# Patient Record
Sex: FEMALE | Race: WHITE
[De-identification: names, ages, dates, MRNs, and addresses within clinical notes are randomized per-mention and may not be internally consistent; named-entity substitution may affect disease eponyms.]

---

## 2018-07-27 LAB
ALBUMIN SERPL-MCNC: 4.3 GM/DL (ref 3.2–4.5)
ALP SERPL-CCNC: 156 U/L (ref 40–136)
ALT SERPL-CCNC: 37 U/L (ref 0–55)
BASOPHILS # BLD AUTO: 0 10^3/UL (ref 0–0.1)
BASOPHILS NFR BLD AUTO: 0 % (ref 0–10)
BILIRUB SERPL-MCNC: 0.3 MG/DL (ref 0.1–1)
BUN/CREAT SERPL: 59
CALCIUM SERPL-MCNC: 10.4 MG/DL (ref 8.5–10.1)
CHLORIDE SERPL-SCNC: 102 MMOL/L (ref 98–107)
CO2 SERPL-SCNC: 26 MMOL/L (ref 21–32)
CREAT SERPL-MCNC: 0.7 MG/DL (ref 0.6–1.3)
EOSINOPHIL # BLD AUTO: 0.7 10^3/UL (ref 0–0.3)
EOSINOPHIL NFR BLD AUTO: 7 % (ref 0–10)
ERYTHROCYTE [DISTWIDTH] IN BLOOD BY AUTOMATED COUNT: 18.8 % (ref 10–14.5)
GFR SERPLBLD BASED ON 1.73 SQ M-ARVRAT: > 60 ML/MIN
GLUCOSE SERPL-MCNC: 107 MG/DL (ref 70–105)
HCT VFR BLD CALC: 38 % (ref 35–52)
HGB BLD-MCNC: 12.3 G/DL (ref 11.5–16)
LYMPHOCYTES # BLD AUTO: 1.6 X 10^3 (ref 1–4)
LYMPHOCYTES NFR BLD AUTO: 18 % (ref 12–44)
MANUAL DIFFERENTIAL PERFORMED BLD QL: NO
MCH RBC QN AUTO: 29 PG (ref 25–34)
MCHC RBC AUTO-ENTMCNC: 32 G/DL (ref 32–36)
MCV RBC AUTO: 91 FL (ref 80–99)
MONOCYTES # BLD AUTO: 0.9 X 10^3 (ref 0–1)
MONOCYTES NFR BLD AUTO: 10 % (ref 0–12)
NEUTROPHILS # BLD AUTO: 6 X 10^3 (ref 1.8–7.8)
NEUTROPHILS NFR BLD AUTO: 65 % (ref 42–75)
PLATELET # BLD: 206 10^3/UL (ref 130–400)
PMV BLD AUTO: 11.4 FL (ref 7.4–10.4)
POTASSIUM SERPL-SCNC: 4.5 MMOL/L (ref 3.6–5)
PROT SERPL-MCNC: 7.9 GM/DL (ref 6.4–8.2)
RBC # BLD AUTO: 4.22 10^6/UL (ref 4.35–5.85)
SODIUM SERPL-SCNC: 138 MMOL/L (ref 135–145)
WBC # BLD AUTO: 9.3 10^3/UL (ref 4.3–11)

## 2018-07-31 ENCOUNTER — HOSPITAL ENCOUNTER (OUTPATIENT)
Dept: HOSPITAL 75 - ONC | Age: 78
LOS: 7 days | Discharge: HOME | End: 2018-08-07
Attending: INTERNAL MEDICINE
Payer: MEDICARE

## 2018-07-31 VITALS — BODY MASS INDEX: 19.63 KG/M2 | WEIGHT: 104 LBS | HEIGHT: 61 IN

## 2018-07-31 DIAGNOSIS — C78.7: ICD-10-CM

## 2018-07-31 DIAGNOSIS — C25.0: ICD-10-CM

## 2018-07-31 DIAGNOSIS — Z93.1: ICD-10-CM

## 2018-07-31 DIAGNOSIS — C79.51: ICD-10-CM

## 2018-07-31 DIAGNOSIS — C78.01: ICD-10-CM

## 2018-07-31 DIAGNOSIS — Z79.899: ICD-10-CM

## 2018-07-31 DIAGNOSIS — C78.02: ICD-10-CM

## 2018-07-31 DIAGNOSIS — Z51.11: Primary | ICD-10-CM

## 2018-07-31 PROCEDURE — 96413 CHEMO IV INFUSION 1 HR: CPT

## 2018-07-31 PROCEDURE — 96375 TX/PRO/DX INJ NEW DRUG ADDON: CPT

## 2018-07-31 PROCEDURE — 80048 BASIC METABOLIC PNL TOTAL CA: CPT

## 2018-07-31 PROCEDURE — 36415 COLL VENOUS BLD VENIPUNCTURE: CPT

## 2018-07-31 PROCEDURE — 86301 IMMUNOASSAY TUMOR CA 19-9: CPT

## 2018-07-31 PROCEDURE — 96417 CHEMO IV INFUS EACH ADDL SEQ: CPT

## 2018-07-31 PROCEDURE — 99214 OFFICE O/P EST MOD 30 MIN: CPT

## 2018-07-31 PROCEDURE — 80053 COMPREHEN METABOLIC PANEL: CPT

## 2018-07-31 PROCEDURE — 85025 COMPLETE CBC W/AUTO DIFF WBC: CPT

## 2018-08-07 LAB
BASOPHILS # BLD AUTO: 0 10^3/UL (ref 0–0.1)
BASOPHILS NFR BLD AUTO: 0 % (ref 0–10)
BUN/CREAT SERPL: 49
CALCIUM SERPL-MCNC: 9.7 MG/DL (ref 8.5–10.1)
CHLORIDE SERPL-SCNC: 103 MMOL/L (ref 98–107)
CO2 SERPL-SCNC: 27 MMOL/L (ref 21–32)
CREAT SERPL-MCNC: 0.65 MG/DL (ref 0.6–1.3)
EOSINOPHIL # BLD AUTO: 0.3 10^3/UL (ref 0–0.3)
EOSINOPHIL NFR BLD AUTO: 8 % (ref 0–10)
ERYTHROCYTE [DISTWIDTH] IN BLOOD BY AUTOMATED COUNT: 17.3 % (ref 10–14.5)
GFR SERPLBLD BASED ON 1.73 SQ M-ARVRAT: > 60 ML/MIN
GLUCOSE SERPL-MCNC: 123 MG/DL (ref 70–105)
HCT VFR BLD CALC: 34 % (ref 35–52)
HGB BLD-MCNC: 11 G/DL (ref 11.5–16)
LYMPHOCYTES # BLD AUTO: 1.2 X 10^3 (ref 1–4)
LYMPHOCYTES NFR BLD AUTO: 37 % (ref 12–44)
MANUAL DIFFERENTIAL PERFORMED BLD QL: NO
MCH RBC QN AUTO: 29 PG (ref 25–34)
MCHC RBC AUTO-ENTMCNC: 32 G/DL (ref 32–36)
MCV RBC AUTO: 90 FL (ref 80–99)
MONOCYTES # BLD AUTO: 0.2 X 10^3 (ref 0–1)
MONOCYTES NFR BLD AUTO: 7 % (ref 0–12)
NEUTROPHILS # BLD AUTO: 1.5 X 10^3 (ref 1.8–7.8)
NEUTROPHILS NFR BLD AUTO: 47 % (ref 42–75)
PLATELET # BLD: 150 10^3/UL (ref 130–400)
PMV BLD AUTO: 10.6 FL (ref 7.4–10.4)
POTASSIUM SERPL-SCNC: 4.5 MMOL/L (ref 3.6–5)
RBC # BLD AUTO: 3.77 10^6/UL (ref 4.35–5.85)
SODIUM SERPL-SCNC: 138 MMOL/L (ref 135–145)
WBC # BLD AUTO: 3.1 10^3/UL (ref 4.3–11)

## 2018-08-14 LAB
BASOPHILS # BLD AUTO: 0 10^3/UL (ref 0–0.1)
BASOPHILS NFR BLD AUTO: 0 % (ref 0–10)
BUN/CREAT SERPL: 40
CALCIUM SERPL-MCNC: 9.5 MG/DL (ref 8.5–10.1)
CHLORIDE SERPL-SCNC: 104 MMOL/L (ref 98–107)
CO2 SERPL-SCNC: 26 MMOL/L (ref 21–32)
CREAT SERPL-MCNC: 0.57 MG/DL (ref 0.6–1.3)
EOSINOPHIL # BLD AUTO: 0.1 10^3/UL (ref 0–0.3)
EOSINOPHIL NFR BLD AUTO: 4 % (ref 0–10)
ERYTHROCYTE [DISTWIDTH] IN BLOOD BY AUTOMATED COUNT: 16.2 % (ref 10–14.5)
GFR SERPLBLD BASED ON 1.73 SQ M-ARVRAT: > 60 ML/MIN
GLUCOSE SERPL-MCNC: 113 MG/DL (ref 70–105)
HCT VFR BLD CALC: 32 % (ref 35–52)
HGB BLD-MCNC: 10.5 G/DL (ref 11.5–16)
LYMPHOCYTES # BLD AUTO: 1 X 10^3 (ref 1–4)
LYMPHOCYTES NFR BLD AUTO: 46 % (ref 12–44)
MANUAL DIFFERENTIAL PERFORMED BLD QL: NO
MCH RBC QN AUTO: 29 PG (ref 25–34)
MCHC RBC AUTO-ENTMCNC: 33 G/DL (ref 32–36)
MCV RBC AUTO: 90 FL (ref 80–99)
MONOCYTES # BLD AUTO: 0.1 X 10^3 (ref 0–1)
MONOCYTES NFR BLD AUTO: 7 % (ref 0–12)
NEUTROPHILS # BLD AUTO: 0.9 X 10^3 (ref 1.8–7.8)
NEUTROPHILS NFR BLD AUTO: 44 % (ref 42–75)
PLATELET # BLD: 81 10^3/UL (ref 130–400)
PMV BLD AUTO: 9.6 FL (ref 7.4–10.4)
POTASSIUM SERPL-SCNC: 4.3 MMOL/L (ref 3.6–5)
RBC # BLD AUTO: 3.57 10^6/UL (ref 4.35–5.85)
SODIUM SERPL-SCNC: 138 MMOL/L (ref 135–145)
WBC # BLD AUTO: 2.1 10^3/UL (ref 4.3–11)

## 2018-08-21 LAB
ALBUMIN SERPL-MCNC: 4.1 GM/DL (ref 3.2–4.5)
ALP SERPL-CCNC: 148 U/L (ref 40–136)
ALT SERPL-CCNC: 34 U/L (ref 0–55)
BASOPHILS # BLD AUTO: 0 10^3/UL (ref 0–0.1)
BASOPHILS NFR BLD AUTO: 0 % (ref 0–10)
BILIRUB SERPL-MCNC: 0.2 MG/DL (ref 0.1–1)
BUN/CREAT SERPL: 39
CALCIUM SERPL-MCNC: 9.9 MG/DL (ref 8.5–10.1)
CHLORIDE SERPL-SCNC: 102 MMOL/L (ref 98–107)
CO2 SERPL-SCNC: 28 MMOL/L (ref 21–32)
CREAT SERPL-MCNC: 0.62 MG/DL (ref 0.6–1.3)
EOSINOPHIL # BLD AUTO: 0.1 10^3/UL (ref 0–0.3)
EOSINOPHIL NFR BLD AUTO: 2 % (ref 0–10)
ERYTHROCYTE [DISTWIDTH] IN BLOOD BY AUTOMATED COUNT: 17.7 % (ref 10–14.5)
GFR SERPLBLD BASED ON 1.73 SQ M-ARVRAT: > 60 ML/MIN
GLUCOSE SERPL-MCNC: 124 MG/DL (ref 70–105)
HCT VFR BLD CALC: 37 % (ref 35–52)
HGB BLD-MCNC: 12.3 G/DL (ref 11.5–16)
LYMPHOCYTES # BLD AUTO: 1.3 X 10^3 (ref 1–4)
LYMPHOCYTES NFR BLD AUTO: 26 % (ref 12–44)
MANUAL DIFFERENTIAL PERFORMED BLD QL: NO
MCH RBC QN AUTO: 30 PG (ref 25–34)
MCHC RBC AUTO-ENTMCNC: 33 G/DL (ref 32–36)
MCV RBC AUTO: 92 FL (ref 80–99)
MONOCYTES # BLD AUTO: 0.5 X 10^3 (ref 0–1)
MONOCYTES NFR BLD AUTO: 11 % (ref 0–12)
NEUTROPHILS # BLD AUTO: 3 X 10^3 (ref 1.8–7.8)
NEUTROPHILS NFR BLD AUTO: 60 % (ref 42–75)
PLATELET # BLD: 353 10^3/UL (ref 130–400)
PMV BLD AUTO: 9.8 FL (ref 7.4–10.4)
POTASSIUM SERPL-SCNC: 4.5 MMOL/L (ref 3.6–5)
PROT SERPL-MCNC: 7.2 GM/DL (ref 6.4–8.2)
RBC # BLD AUTO: 4.05 10^6/UL (ref 4.35–5.85)
SODIUM SERPL-SCNC: 137 MMOL/L (ref 135–145)
WBC # BLD AUTO: 4.9 10^3/UL (ref 4.3–11)

## 2018-08-28 LAB
ALBUMIN SERPL-MCNC: 4 GM/DL (ref 3.2–4.5)
ALP SERPL-CCNC: 157 U/L (ref 40–136)
ALT SERPL-CCNC: 35 U/L (ref 0–55)
BASOPHILS # BLD AUTO: 0 10^3/UL (ref 0–0.1)
BASOPHILS NFR BLD AUTO: 0 % (ref 0–10)
BILIRUB SERPL-MCNC: 0.3 MG/DL (ref 0.1–1)
BUN/CREAT SERPL: 41
CALCIUM SERPL-MCNC: 9.8 MG/DL (ref 8.5–10.1)
CHLORIDE SERPL-SCNC: 104 MMOL/L (ref 98–107)
CO2 SERPL-SCNC: 22 MMOL/L (ref 21–32)
CREAT SERPL-MCNC: 0.63 MG/DL (ref 0.6–1.3)
EOSINOPHIL # BLD AUTO: 0.2 10^3/UL (ref 0–0.3)
EOSINOPHIL NFR BLD AUTO: 3 % (ref 0–10)
ERYTHROCYTE [DISTWIDTH] IN BLOOD BY AUTOMATED COUNT: 17.1 % (ref 10–14.5)
GFR SERPLBLD BASED ON 1.73 SQ M-ARVRAT: > 60 ML/MIN
GLUCOSE SERPL-MCNC: 128 MG/DL (ref 70–105)
HCT VFR BLD CALC: 37 % (ref 35–52)
HGB BLD-MCNC: 12.4 G/DL (ref 11.5–16)
LYMPHOCYTES # BLD AUTO: 1.5 X 10^3 (ref 1–4)
LYMPHOCYTES NFR BLD AUTO: 21 % (ref 12–44)
MANUAL DIFFERENTIAL PERFORMED BLD QL: NO
MCH RBC QN AUTO: 31 PG (ref 25–34)
MCHC RBC AUTO-ENTMCNC: 34 G/DL (ref 32–36)
MCV RBC AUTO: 91 FL (ref 80–99)
MONOCYTES # BLD AUTO: 1 X 10^3 (ref 0–1)
MONOCYTES NFR BLD AUTO: 14 % (ref 0–12)
NEUTROPHILS # BLD AUTO: 4.3 X 10^3 (ref 1.8–7.8)
NEUTROPHILS NFR BLD AUTO: 62 % (ref 42–75)
PLATELET # BLD: 512 10^3/UL (ref 130–400)
PMV BLD AUTO: 9.7 FL (ref 7.4–10.4)
POTASSIUM SERPL-SCNC: 4.6 MMOL/L (ref 3.6–5)
PROT SERPL-MCNC: 7.3 GM/DL (ref 6.4–8.2)
RBC # BLD AUTO: 4.05 10^6/UL (ref 4.35–5.85)
SODIUM SERPL-SCNC: 137 MMOL/L (ref 135–145)
WBC # BLD AUTO: 6.9 10^3/UL (ref 4.3–11)

## 2018-09-11 LAB
ALBUMIN SERPL-MCNC: 3.9 GM/DL (ref 3.2–4.5)
ALP SERPL-CCNC: 143 U/L (ref 40–136)
ALT SERPL-CCNC: 41 U/L (ref 0–55)
BASOPHILS # BLD AUTO: 0 10^3/UL (ref 0–0.1)
BASOPHILS NFR BLD AUTO: 0 % (ref 0–10)
BILIRUB SERPL-MCNC: 0.3 MG/DL (ref 0.1–1)
BUN/CREAT SERPL: 46
CALCIUM SERPL-MCNC: 9.8 MG/DL (ref 8.5–10.1)
CHLORIDE SERPL-SCNC: 105 MMOL/L (ref 98–107)
CO2 SERPL-SCNC: 25 MMOL/L (ref 21–32)
CREAT SERPL-MCNC: 0.61 MG/DL (ref 0.6–1.3)
EOSINOPHIL # BLD AUTO: 0.5 10^3/UL (ref 0–0.3)
EOSINOPHIL NFR BLD AUTO: 6 % (ref 0–10)
ERYTHROCYTE [DISTWIDTH] IN BLOOD BY AUTOMATED COUNT: 16.9 % (ref 10–14.5)
GFR SERPLBLD BASED ON 1.73 SQ M-ARVRAT: > 60 ML/MIN
GLUCOSE SERPL-MCNC: 120 MG/DL (ref 70–105)
HCT VFR BLD CALC: 36 % (ref 35–52)
HGB BLD-MCNC: 12.1 G/DL (ref 11.5–16)
LYMPHOCYTES # BLD AUTO: 1.4 X 10^3 (ref 1–4)
LYMPHOCYTES NFR BLD AUTO: 17 % (ref 12–44)
MANUAL DIFFERENTIAL PERFORMED BLD QL: NO
MCH RBC QN AUTO: 31 PG (ref 25–34)
MCHC RBC AUTO-ENTMCNC: 33 G/DL (ref 32–36)
MCV RBC AUTO: 92 FL (ref 80–99)
MONOCYTES # BLD AUTO: 0.9 X 10^3 (ref 0–1)
MONOCYTES NFR BLD AUTO: 11 % (ref 0–12)
NEUTROPHILS # BLD AUTO: 5.1 X 10^3 (ref 1.8–7.8)
NEUTROPHILS NFR BLD AUTO: 65 % (ref 42–75)
PLATELET # BLD: 133 10^3/UL (ref 130–400)
PMV BLD AUTO: 10 FL (ref 7.4–10.4)
POTASSIUM SERPL-SCNC: 4.4 MMOL/L (ref 3.6–5)
PROT SERPL-MCNC: 6.9 GM/DL (ref 6.4–8.2)
RBC # BLD AUTO: 3.92 10^6/UL (ref 4.35–5.85)
SODIUM SERPL-SCNC: 138 MMOL/L (ref 135–145)
WBC # BLD AUTO: 7.9 10^3/UL (ref 4.3–11)

## 2018-09-25 ENCOUNTER — HOSPITAL ENCOUNTER (OUTPATIENT)
Dept: HOSPITAL 75 - ONC | Age: 78
LOS: 14 days | Discharge: HOME | End: 2018-10-09
Attending: INTERNAL MEDICINE
Payer: MEDICARE

## 2018-09-25 VITALS — BODY MASS INDEX: 20.01 KG/M2 | WEIGHT: 106 LBS | HEIGHT: 61 IN

## 2018-09-25 DIAGNOSIS — Z93.1: ICD-10-CM

## 2018-09-25 DIAGNOSIS — C25.0: ICD-10-CM

## 2018-09-25 DIAGNOSIS — C78.02: ICD-10-CM

## 2018-09-25 DIAGNOSIS — Z51.11: Primary | ICD-10-CM

## 2018-09-25 DIAGNOSIS — C79.51: ICD-10-CM

## 2018-09-25 DIAGNOSIS — C78.7: ICD-10-CM

## 2018-09-25 DIAGNOSIS — C78.01: ICD-10-CM

## 2018-09-25 DIAGNOSIS — Z79.899: ICD-10-CM

## 2018-09-25 LAB
ALBUMIN SERPL-MCNC: 4 GM/DL (ref 3.2–4.5)
ALP SERPL-CCNC: 142 U/L (ref 40–136)
ALT SERPL-CCNC: 44 U/L (ref 0–55)
BASOPHILS # BLD AUTO: 0 10^3/UL (ref 0–0.1)
BASOPHILS NFR BLD AUTO: 0 % (ref 0–10)
BILIRUB SERPL-MCNC: 0.3 MG/DL (ref 0.1–1)
BUN/CREAT SERPL: 47
CALCIUM SERPL-MCNC: 9.6 MG/DL (ref 8.5–10.1)
CHLORIDE SERPL-SCNC: 104 MMOL/L (ref 98–107)
CO2 SERPL-SCNC: 25 MMOL/L (ref 21–32)
CREAT SERPL-MCNC: 0.62 MG/DL (ref 0.6–1.3)
EOSINOPHIL # BLD AUTO: 0.3 10^3/UL (ref 0–0.3)
EOSINOPHIL NFR BLD AUTO: 4 % (ref 0–10)
ERYTHROCYTE [DISTWIDTH] IN BLOOD BY AUTOMATED COUNT: 16.6 % (ref 10–14.5)
GFR SERPLBLD BASED ON 1.73 SQ M-ARVRAT: > 60 ML/MIN
GLUCOSE SERPL-MCNC: 119 MG/DL (ref 70–105)
HCT VFR BLD CALC: 36 % (ref 35–52)
HGB BLD-MCNC: 12.1 G/DL (ref 11.5–16)
LYMPHOCYTES # BLD AUTO: 1.4 X 10^3 (ref 1–4)
LYMPHOCYTES NFR BLD AUTO: 19 % (ref 12–44)
MANUAL DIFFERENTIAL PERFORMED BLD QL: NO
MCH RBC QN AUTO: 31 PG (ref 25–34)
MCHC RBC AUTO-ENTMCNC: 33 G/DL (ref 32–36)
MCV RBC AUTO: 93 FL (ref 80–99)
MONOCYTES # BLD AUTO: 0.8 X 10^3 (ref 0–1)
MONOCYTES NFR BLD AUTO: 12 % (ref 0–12)
NEUTROPHILS # BLD AUTO: 4.7 X 10^3 (ref 1.8–7.8)
NEUTROPHILS NFR BLD AUTO: 66 % (ref 42–75)
PLATELET # BLD: 209 10^3/UL (ref 130–400)
PMV BLD AUTO: 9.8 FL (ref 7.4–10.4)
POTASSIUM SERPL-SCNC: 4.6 MMOL/L (ref 3.6–5)
PROT SERPL-MCNC: 7.3 GM/DL (ref 6.4–8.2)
RBC # BLD AUTO: 3.92 10^6/UL (ref 4.35–5.85)
SODIUM SERPL-SCNC: 138 MMOL/L (ref 135–145)
WBC # BLD AUTO: 7.1 10^3/UL (ref 4.3–11)

## 2018-09-25 PROCEDURE — 96375 TX/PRO/DX INJ NEW DRUG ADDON: CPT

## 2018-09-25 PROCEDURE — 96413 CHEMO IV INFUSION 1 HR: CPT

## 2018-09-25 PROCEDURE — 80048 BASIC METABOLIC PNL TOTAL CA: CPT

## 2018-09-25 PROCEDURE — 86301 IMMUNOASSAY TUMOR CA 19-9: CPT

## 2018-09-25 PROCEDURE — 36591 DRAW BLOOD OFF VENOUS DEVICE: CPT

## 2018-09-25 PROCEDURE — 80053 COMPREHEN METABOLIC PANEL: CPT

## 2018-09-25 PROCEDURE — 96417 CHEMO IV INFUS EACH ADDL SEQ: CPT

## 2018-09-25 PROCEDURE — 36415 COLL VENOUS BLD VENIPUNCTURE: CPT

## 2018-09-25 PROCEDURE — 85025 COMPLETE CBC W/AUTO DIFF WBC: CPT

## 2018-10-09 LAB
BASOPHILS # BLD AUTO: 0 10^3/UL (ref 0–0.1)
BASOPHILS NFR BLD AUTO: 0 % (ref 0–10)
BUN/CREAT SERPL: 41
CALCIUM SERPL-MCNC: 9.4 MG/DL (ref 8.5–10.1)
CHLORIDE SERPL-SCNC: 105 MMOL/L (ref 98–107)
CO2 SERPL-SCNC: 25 MMOL/L (ref 21–32)
CREAT SERPL-MCNC: 0.58 MG/DL (ref 0.6–1.3)
EOSINOPHIL # BLD AUTO: 0.2 10^3/UL (ref 0–0.3)
EOSINOPHIL NFR BLD AUTO: 3 % (ref 0–10)
ERYTHROCYTE [DISTWIDTH] IN BLOOD BY AUTOMATED COUNT: 15.9 % (ref 10–14.5)
GFR SERPLBLD BASED ON 1.73 SQ M-ARVRAT: > 60 ML/MIN
GLUCOSE SERPL-MCNC: 90 MG/DL (ref 70–105)
HCT VFR BLD CALC: 35 % (ref 35–52)
HGB BLD-MCNC: 11.2 G/DL (ref 11.5–16)
LYMPHOCYTES # BLD AUTO: 1.6 X 10^3 (ref 1–4)
LYMPHOCYTES NFR BLD AUTO: 25 % (ref 12–44)
MANUAL DIFFERENTIAL PERFORMED BLD QL: NO
MCH RBC QN AUTO: 31 PG (ref 25–34)
MCHC RBC AUTO-ENTMCNC: 33 G/DL (ref 32–36)
MCV RBC AUTO: 95 FL (ref 80–99)
MONOCYTES # BLD AUTO: 0.9 X 10^3 (ref 0–1)
MONOCYTES NFR BLD AUTO: 14 % (ref 0–12)
NEUTROPHILS # BLD AUTO: 3.7 X 10^3 (ref 1.8–7.8)
NEUTROPHILS NFR BLD AUTO: 58 % (ref 42–75)
PLATELET # BLD: 231 10^3/UL (ref 130–400)
PMV BLD AUTO: 10 FL (ref 7.4–10.4)
POTASSIUM SERPL-SCNC: 4.5 MMOL/L (ref 3.6–5)
RBC # BLD AUTO: 3.64 10^6/UL (ref 4.35–5.85)
SODIUM SERPL-SCNC: 138 MMOL/L (ref 135–145)
WBC # BLD AUTO: 6.3 10^3/UL (ref 4.3–11)

## 2018-10-23 LAB
ALBUMIN SERPL-MCNC: 4 GM/DL (ref 3.2–4.5)
ALP SERPL-CCNC: 595 U/L (ref 40–136)
ALT SERPL-CCNC: 498 U/L (ref 0–55)
BASOPHILS # BLD AUTO: 0 10^3/UL (ref 0–0.1)
BASOPHILS NFR BLD AUTO: 0 % (ref 0–10)
BILIRUB SERPL-MCNC: 0.5 MG/DL (ref 0.1–1)
BUN/CREAT SERPL: 41
CALCIUM SERPL-MCNC: 9.8 MG/DL (ref 8.5–10.1)
CHLORIDE SERPL-SCNC: 103 MMOL/L (ref 98–107)
CO2 SERPL-SCNC: 23 MMOL/L (ref 21–32)
CREAT SERPL-MCNC: 0.61 MG/DL (ref 0.6–1.3)
EOSINOPHIL # BLD AUTO: 0.2 10^3/UL (ref 0–0.3)
EOSINOPHIL NFR BLD AUTO: 3 % (ref 0–10)
ERYTHROCYTE [DISTWIDTH] IN BLOOD BY AUTOMATED COUNT: 15.9 % (ref 10–14.5)
GFR SERPLBLD BASED ON 1.73 SQ M-ARVRAT: > 60 ML/MIN
GLUCOSE SERPL-MCNC: 132 MG/DL (ref 70–105)
HCT VFR BLD CALC: 35 % (ref 35–52)
HGB BLD-MCNC: 11.3 G/DL (ref 11.5–16)
LYMPHOCYTES # BLD AUTO: 1.2 X 10^3 (ref 1–4)
LYMPHOCYTES NFR BLD AUTO: 19 % (ref 12–44)
MANUAL DIFFERENTIAL PERFORMED BLD QL: NO
MCH RBC QN AUTO: 31 PG (ref 25–34)
MCHC RBC AUTO-ENTMCNC: 32 G/DL (ref 32–36)
MCV RBC AUTO: 94 FL (ref 80–99)
MONOCYTES # BLD AUTO: 0.8 X 10^3 (ref 0–1)
MONOCYTES NFR BLD AUTO: 13 % (ref 0–12)
NEUTROPHILS # BLD AUTO: 4.1 X 10^3 (ref 1.8–7.8)
NEUTROPHILS NFR BLD AUTO: 66 % (ref 42–75)
PLATELET # BLD: 164 10^3/UL (ref 130–400)
PMV BLD AUTO: 10.1 FL (ref 7.4–10.4)
POTASSIUM SERPL-SCNC: 4.5 MMOL/L (ref 3.6–5)
PROT SERPL-MCNC: 7.5 GM/DL (ref 6.4–8.2)
RBC # BLD AUTO: 3.71 10^6/UL (ref 4.35–5.85)
SODIUM SERPL-SCNC: 138 MMOL/L (ref 135–145)
WBC # BLD AUTO: 6.2 10^3/UL (ref 4.3–11)

## 2018-10-24 ENCOUNTER — HOSPITAL ENCOUNTER (INPATIENT)
Dept: HOSPITAL 75 - 4TH | Age: 78
LOS: 5 days | Discharge: NURSING, REHAB OR LONG-TERM CARE | DRG: 481 | End: 2018-10-29
Attending: FAMILY MEDICINE | Admitting: FAMILY MEDICINE
Payer: MEDICARE

## 2018-10-24 VITALS — WEIGHT: 113.44 LBS | HEIGHT: 62 IN | BODY MASS INDEX: 20.88 KG/M2

## 2018-10-24 VITALS — SYSTOLIC BLOOD PRESSURE: 120 MMHG | DIASTOLIC BLOOD PRESSURE: 56 MMHG

## 2018-10-24 VITALS — SYSTOLIC BLOOD PRESSURE: 139 MMHG | DIASTOLIC BLOOD PRESSURE: 64 MMHG

## 2018-10-24 VITALS — DIASTOLIC BLOOD PRESSURE: 63 MMHG | SYSTOLIC BLOOD PRESSURE: 150 MMHG

## 2018-10-24 VITALS — DIASTOLIC BLOOD PRESSURE: 53 MMHG | SYSTOLIC BLOOD PRESSURE: 106 MMHG

## 2018-10-24 DIAGNOSIS — C79.51: ICD-10-CM
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
## 2018-10-24 DIAGNOSIS — R74.0: ICD-10-CM
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
## 2018-10-24 DIAGNOSIS — D62: ICD-10-CM

## 2018-10-24 DIAGNOSIS — Z92.21: ICD-10-CM

## 2018-10-24 DIAGNOSIS — C78.01: ICD-10-CM

## 2018-10-24 DIAGNOSIS — S72.142A: Primary | ICD-10-CM

## 2018-10-24 DIAGNOSIS — S72.22XA: ICD-10-CM

## 2018-10-24 DIAGNOSIS — G72.41: ICD-10-CM

## 2018-10-24 DIAGNOSIS — C25.0: ICD-10-CM

## 2018-10-24 DIAGNOSIS — C78.02: ICD-10-CM

## 2018-10-24 DIAGNOSIS — R64: ICD-10-CM

## 2018-10-24 DIAGNOSIS — Z93.1: ICD-10-CM

## 2018-10-24 DIAGNOSIS — C78.7: ICD-10-CM

## 2018-10-24 PROCEDURE — 86900 BLOOD TYPING SEROLOGIC ABO: CPT

## 2018-10-24 PROCEDURE — 87081 CULTURE SCREEN ONLY: CPT

## 2018-10-24 PROCEDURE — 82962 GLUCOSE BLOOD TEST: CPT

## 2018-10-24 PROCEDURE — 36415 COLL VENOUS BLD VENIPUNCTURE: CPT

## 2018-10-24 PROCEDURE — 86850 RBC ANTIBODY SCREEN: CPT

## 2018-10-24 PROCEDURE — 80053 COMPREHEN METABOLIC PANEL: CPT

## 2018-10-24 PROCEDURE — 0QS736Z REPOSITION LEFT UPPER FEMUR WITH INTRAMEDULLARY INTERNAL FIXATION DEVICE, PERCUTANEOUS APPROACH: ICD-10-PCS | Performed by: ORTHOPAEDIC SURGERY

## 2018-10-24 PROCEDURE — 86901 BLOOD TYPING SEROLOGIC RH(D): CPT

## 2018-10-24 PROCEDURE — 85025 COMPLETE CBC W/AUTO DIFF WBC: CPT

## 2018-10-24 PROCEDURE — 80048 BASIC METABOLIC PNL TOTAL CA: CPT

## 2018-10-24 PROCEDURE — 94760 N-INVAS EAR/PLS OXIMETRY 1: CPT

## 2018-10-24 PROCEDURE — 86920 COMPATIBILITY TEST SPIN: CPT

## 2018-10-24 RX ADMIN — FENTANYL CITRATE PRN MCG: 50 INJECTION, SOLUTION INTRAMUSCULAR; INTRAVENOUS at 11:30

## 2018-10-24 RX ADMIN — FENTANYL CITRATE PRN MCG: 50 INJECTION, SOLUTION INTRAMUSCULAR; INTRAVENOUS at 15:53

## 2018-10-24 RX ADMIN — FENTANYL CITRATE PRN MCG: 50 INJECTION, SOLUTION INTRAMUSCULAR; INTRAVENOUS at 13:53

## 2018-10-24 RX ADMIN — SODIUM CHLORIDE, SODIUM LACTATE, POTASSIUM CHLORIDE, AND CALCIUM CHLORIDE SCH MLS/HR: 600; 310; 30; 20 INJECTION, SOLUTION INTRAVENOUS at 17:44

## 2018-10-24 RX ADMIN — Medication SCH ML: at 13:53

## 2018-10-24 RX ADMIN — SODIUM CHLORIDE, SODIUM LACTATE, POTASSIUM CHLORIDE, AND CALCIUM CHLORIDE SCH MLS/HR: 600; 310; 30; 20 INJECTION, SOLUTION INTRAVENOUS at 19:30

## 2018-10-24 RX ADMIN — Medication SCH ML: at 21:49

## 2018-10-24 RX ADMIN — SODIUM CHLORIDE, SODIUM LACTATE, POTASSIUM CHLORIDE, AND CALCIUM CHLORIDE SCH MLS/HR: 600; 310; 30; 20 INJECTION, SOLUTION INTRAVENOUS at 23:30

## 2018-10-24 NOTE — PROGRESS NOTE-PRE OPERATIVE
Pre-Operative Progress Note


H&P Reviewed


The H&P was reviewed, patient examined and no changes noted.


Date Seen by Provider:  Oct 24, 2018


Time Seen by Provider:  12:00


Date H&P Reviewed:  Oct 24, 2018


Time H&P Reviewed:  12:00


Pre-Operative Diagnosis:  Displaced intertrochanteric/subtrochanteric fracture 

left hip











SUSANA POSADA DO Oct 24, 2018 8:17 pm

## 2018-10-24 NOTE — CONSULTATION
History of Present Illness


History of Present Illness


Patient Consulted On(pavan/time)


10/24/18


 11:54


Date Seen by Provider:  Oct 24, 2018


Time Seen by Provider:  11:30


Reason for Visit:  fell and dx with left hip fracture


History of Present Illness


Patient fell and was seen through [facility]. She is currently being 

treated through [facility] for pancreatic cancer. She ambulates 

independently but does have bilateral lower extremity weakness. She fell and 

was diagnosed with a hip fracture. She was transferred here for orthopedic 

services and possible surgery as well as medical management.





Allergies and Home Medications


Allergies


Coded Allergies:  


     midazolam (Verified  Allergy, Severe, 10/24/18)


 


 CODED


     Sulfa (Sulfonamide Antibiotics) (Verified  Allergy, Unknown, 10/24/18)





Home Medications


Albuterol Sulfate 18 Gm Hfa.aer.ad, 1-2 PUFF INH QID PRN for CONGESTION, (

Reported)


Diphenhydramine HCl 25 Mg Capsule, 25 MG PO HS, (Reported)


Levothyroxine Sodium 75 Mcg Tablet, 75 MCG PO DAILY, (Reported)


Lipase/Protease/Amylase 1 Each Capsule.dr, 1 CAP PO TIDAC, (Reported)


Loratadine 10 Mg Tablet, 10 MG PO DAILY, (Reported)


Lorazepam 0.5 Mg Tablet, 0.5 MG PO HS PRN for ANXIETY/SLEEP, (Reported)


Multivits W-Min/Ferrous Gluc 9 Mg/15 Ml Liquid, 15 ML GT DAILY, (Reported)


Ondansetron HCl 8 Mg Tablet, 8 MG PO BID PRN for NAUSEA/VOMITING-1ST LINE, (

Reported)


Tramadol HCl 50 Mg Tablet, 50 MG PO BID PRN for PAIN-MODERATE, (Reported)





Patient Home Medication List


Home Medication List Reviewed:  Yes





Past Medical-Social-Family Hx


Past Med/Social Hx:  Reviewed Nursing Past Med/Soc Hx


Past Medical History


Pregnant:  No





Review of Systems-General


Constitutional:  no symptoms reported


EENTM:  no symptoms reported


Respiratory:  cough, dyspnea on exertion


Cardiovascular:  no symptoms reported


Gastrointestinal:  loss of appetite


Genitourinary:  no symptoms reported


Musculoskeletal:  joint pain, joint swelling


Skin:  dryness


Psychiatric/Neurological:  No Symptoms Reported





Physical Exam-General Problems


Physical Exam


Vital Signs


Capillary Refill :


General Appearance:  no apparent distress, cachetic


HEENT:  PERRL/EOMI, normal ENT inspection


Neck:  non-tender, full range of motion


Respiratory:  chest non-tender, no accessory muscle use


Cardiovascular:  regular rate, rhythm, no JVD, no murmur


Peripheral Pulses:  2+ Left Dors-Pedis (L)


Gastrointestinal:  non tender


Back:  no CVA tenderness


Extremities:  no pedal edema, no calf tenderness, normal capillary refill, 

other (left leg shortening with external rotation, tenderness with ecchymosis 

to left hip)


Neurologic/Psychiatric:  no motor/sensory deficits, alert, normal mood/affect, 

oriented x 3


Skin:  warm/dry





Assessment/Plan


Assessment/Plan


Admission Diagnosis/Plan


A: displaced traumatic intertrochanteric/subtrochanteric fracture of left hip


hx of pancreatic cancer





P: I spoke with patient and family about risks of surgery vs no surgery. They 

want to proceed forward with surgery with the understanding that she is 

chronically ill and very high risk. Orders placed to obtain consent for 

intramedullary nailing of fracture. I discussed risks, indications, 

complications, expectations, and convalescence of surgery and informed consent 

was obtained. All their questions were answered. Dr. Landeros then spoke with 

the patient and family as well and agrees with this note.


Admission Status:  Inpatient Order (span 2 midnights)


Reason for Inpatient Admission:  


intertrochanteric fracture of left hip and needs left hip intramedullary


nailing. She will need extensive medical management after surgery due to


current cancer and comorbidities.











SUZAN BOTELLO Oct 24, 2018 11:59 am

## 2018-10-24 NOTE — DIAGNOSTIC IMAGING REPORT
INDICATION:  Hip fracture undergoing fixation.



TECHNIQUE:  

4 intraprocedural images left hip and femur



CORRELATION STUDY: 

None



FINDINGS:  

Intraoperative imaging demonstrates placement of a longstem

intramedullary aye and proximal nail transfixing the proximal

femur fracture. 

Fluoroscopy time: 2 minutes



IMPRESSION:

1. Internal fixation performed of the left femur fracture.



Dictated by: 



  Dictated on workstation # [id number]

## 2018-10-24 NOTE — OPERATIVE REPORT
DATE OF SERVICE:  10/24/2018



PREOPERATIVE DIAGNOSIS:

Displaced intertrochanteric subtrochanteric fracture, left hip.



POSTOPERATIVE DIAGNOSIS:

Displaced intertrochanteric subtrochanteric fracture, left hip.



PROCEDURE:

Intramedullary nailing intertrochanteric subtrochanteric fracture, left hip.



SURGEON:

Susana Posada DO



FIRST ASSISTANT:

CHERYL Wright



SURGICAL FIRST ASSIST DUTIES:

Hilario Blanco, surgical first assistant was utilized throughout the entire

procedure for patient positioning on the fracture table, soft tissue retraction,

placement of metallic internal fixation device (trochanteric fixation nail),

wound closure, dressing application and the patient transfer.



ANESTHESIA:

General.



ESTIMATED BLOOD LOSS:

150 mL.



INDICATIONS AND FINDINGS:

The patient is an 78-year-old female who slipped and fell in the bathroom.  She

normally walks with a walker.  She has weakness in her left lower extremity and

a foot drop on the left.  She noted immediate left hip pain.  She was evaluated

in [facility].  X-rays were obtained.  A displaced intertrochanteric

subtrochanteric fracture was identified.  The patient was transported to [facility] in [place], [place] for definitive orthopedic care.



DESCRIPTION OF PROCEDURE:

The patient was taken to surgery where an intramedullary nailing of her left hip

fracture was performed utilizing the Synthes system with an 11 mm x 130 degree

360 mm long trochanteric fixation nail.  A 95 mm TFNA fenestrated helical blade

95 mm in length was utilized along with a 44 mm x 5 mm distal locking screw.



PROCEDURE IN DETAIL:

The patient was transferred to the operating room where general inhalation

anesthetic was administered.  The patient was placed supine upon the fracture

table.  The right lower extremity was draped out of the operating field.  Left

lower extremity was then placed in traction.  The C-arm was used to verify

fracture reduction.  A ChloraPrep and sterile drape of the left hip and left

lower extremity was performed.  A longitudinal incision was made proximal to the

greater trochanter and an incision was deepened.  The  hole was then

developed with a guide pin placed through the tip of the greater trochanter with

this area overreamed.  A guide aye was placed within the femur.  The patient had

some anterior angulation of her fracture and with pressure applied to the _____

surface of the hip utilizing a small incision the _____ surface of the hip and a

hip reduction tool and a guide aye was then placed down the femoral shaft. 

Initially, the guide aye was placed to anterior and the distal cortex with the

reduction aye and this reduction tool the midline placement of the guide aye was

obtained distally.



Over the guide aye the canal was reamed to an 11.5 mm diameter.  The 11 mm nail

was then inserted, tapped into position.  Through the outrigger drill guide a

guide pin was placed through the lateral femoral cortex through the aye and into

the central aspect of the femoral head.  This was verified in the AP and lateral

plane.  Lateral cortex was overdrilled and the central core was drilled up

within a cm and a half of the articular surface.  The 95 mm helical blade was

then impacted into position.  The screw was tightened proximally through the aye

in a static fashion.



The outrigger was removed.  The leg was taken out of an adducted position and a

C-arm was used to verify the distal locking holes.  A stab incision was made on

the lateral thigh.  Incision was deepened.  A drill was used to place a drill

bit through the lateral cortex across the femoral aye and the central dynamic

screw went through the medial cortex.  A 44 mm locking screw was then placed. 

The position of the distal screw, the alignment of the aye and the proximal

aspect of the construct was verified fluoroscopically.



The wounds were irrigated extensively with normal saline solution.  The fascia

at the proximal incision was closed with a running suture of 0 Vicryl suture. 

The subcutaneous tissues were closed with 0 and 2-0 Vicryl suture.  The skin was

closed with stainless steel staples and Adaptic Neosporin bulky dressing was

placed in the left thigh and the left leg.  The patient was awakened and was

transported to postop recovery (ICU) in satisfactory condition.





Job ID: [id number]

DocumentID: [id number]

Dictated Date:  10/24/2018 20:25:43

Transcription Date: 10/24/2018 23:08:46

Dictated By: SUSANA POSADA DO

## 2018-10-24 NOTE — PROGRESS NOTE-POST OPERATIVE
Post-Operative Progess Note


Surgeon (s)/Assistant (s)


Surgeon


SUSANA POSADA DO


Assistant:  Hilario Blanco NPJEANIE





Pre-Operative Diagnosis


Displaced intertrochanteric/subtrochanteric fracture left hip





Post-Operative Diagnosis





same





Procedure & Operative Findings


Date of Procedure


10/24/18


Procedure Performed/Findings


Intramedullary nailing intertrochanteric/substrochanteric fracture left hip


Anesthesia Type


General





Estimated Blood Loss


Estimated blood loss (mL):  150 ml





Specimens/Packing


Specimens Removed


none











SUSANA POSADA DO Oct 24, 2018 8:19 pm

## 2018-10-24 NOTE — HISTORY & PHYSICAL-HOSPITALIST
History of Present Illness


HPI/Chief Complaint


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 

presented to outside ER for evaluation after a fall. She states she attempted 

to walk without her walker this morning and stumbled over her feet and fell. 

She is currently living with her brother and sister in law and her sister in 

law found her shortly after the fall and called 911. She was brought to the ER 

at [facility] and was found to have a left intertrochanteric hip fracture. She was 

transferred here for surgical evaluation. She denies any complaints at this 

time but thinks he pain is increasing as the medicine wears off. She notes a 

history of complication follow port placement when she received Versed at . 

She states she "crashed" and believes she was coded but does not know all of 

the details.


Source:  patient


Date Seen


10/24/18


Time Seen by a Provider:  11:51


Attending Physician


Rickey Solano MD


PCP


Miguelina Daley MD


Referring Physician





Date of Admission


Oct 24, 2018 at 10:44 am





Home Medications & Allergies


Home Medications


Reviewed patient Home Medication Reconciliation performed by pharmacy 

medication reconciliations technician and/or nursing.


Patients Allergies have been reviewed.





Allergies





Allergies


Coded Allergies


  midazolam (Verified Allergy, Severe, 10/24/18)


    CODED


  Sulfa (Sulfonamide Antibiotics) (Verified Allergy, Unknown, 10/24/18)








Past Medical-Social-Family Hx


Past Med/Social Hx:  Reviewed Nursing Past Med/Soc Hx


Patient Social History


Employed/Student:  retired


Alcohol Use:  Denies Use


Smoking Status:  Never a Smoker





Past Medical History


Surgeries:  Oophorectomy


port placement


inclusion body myositis


Cancer:  Pancreatic


Did You Recieve Any Treatments:  Yes


What Type of Treatment Did You:  Chemotherapy





Family History


Reviewed Nursing Family Hx


Heart Disease, Cancer





Review of Systems


Constitutional:  No chills, No fever


EENTM:  No blurred vision, No double vision, No nose congestion, No throat pain


Respiratory:  No cough, No dyspnea on exertion, No short of breath


Cardiovascular:  No chest pain, No edema, No palpitations


Gastrointestinal:  No abdominal pain, No constipation, No diarrhea, No nausea, 

No vomiting


Genitourinary:  No dysuria, No frequency


Musculoskeletal:  see HPI, joint pain


Skin:  No lesions, No rash


Psychiatric/Neurological:  Denies Headache, Denies Numbness, Denies Tingling





Physical Exam


Physical Exam


Vital Signs





Vital Signs - First Documented








 10/24/18





 10:45


 


Temp 98.9


 


Pulse 82


 


Resp 14


 


B/P (MAP) 150/63 (92)


 


Pulse Ox 98


 


O2 Delivery Room Air





Capillary Refill :


Height, Weight, BMI


Height: 5'2.00"


Weight: 112lbs. 8.0oz. 51.883880bf; 20.6 BMI


Method:


General Appearance:  No Apparent Distress, Chronically ill, Cachetic, Thin


Neck:  Normal Inspection, Supple


Respiratory:  Lungs Clear, No Respiratory Distress


Cardiovascular:  Regular Rate, Rhythm, No Murmur


Gastrointestinal:  Normal Bowel Sounds, Non Tender, Soft


Extremity:  Normal Capillary Refill, No Calf Tenderness, No Pedal Edema


Neurologic/Psychiatric:  Alert, Oriented x3





Results


Results/Procedures


Labs


Patient resulted labs reviewed.


Imaging:  Reviewed Imaging Report





Assessment/Plan


Admission Diagnosis


left hip fracture


Admission Status:  Inpatient Order (span 2 midnights)


Reason for Inpatient Admission:  


Needs more than two midnights to stabilize for DC





Diagnosis/Problems


Diagnosis/Problems





(1) Intertrochanteric fracture of left hip


Status:  Acute


Assessment & Plan:  Ortho consulted, appreciate recs


Plan for OR today


Moderate risk for surgery


Discussed complication from previous anesthesia with CRNA 


   Attempt to find records from incident


Qualifiers:  


   Encounter type:  initial encounter  Fracture type:  closed  Fracture 

alignment:  displaced  Qualified Codes:  S72.142A - Displaced intertrochanteric 

fracture of left femur, initial encounter for closed fracture


(2) Pancreatic cancer


Status:  Chronic


Assessment & Plan:  Follows with Dr Edwards


Received chemo yesterday


Will consult Dr Edwards


Qualifiers:  


   Pancreatic malignancy location:  head of pancreas  Qualified Codes:  C25.0 - 

Malignant neoplasm of head of pancreas


(3) Inclusion body myositis


Assessment & Plan:  Will consult PT/OT 





(4) Transaminitis


Assessment & Plan:  New per yesterday's labs


Will repeat in AM














RICKEY SOLANO MD Oct 24, 2018 11:57 am

## 2018-10-25 VITALS — DIASTOLIC BLOOD PRESSURE: 55 MMHG | SYSTOLIC BLOOD PRESSURE: 109 MMHG

## 2018-10-25 VITALS — DIASTOLIC BLOOD PRESSURE: 58 MMHG | SYSTOLIC BLOOD PRESSURE: 125 MMHG

## 2018-10-25 VITALS — DIASTOLIC BLOOD PRESSURE: 46 MMHG | SYSTOLIC BLOOD PRESSURE: 97 MMHG

## 2018-10-25 VITALS — SYSTOLIC BLOOD PRESSURE: 100 MMHG | DIASTOLIC BLOOD PRESSURE: 51 MMHG

## 2018-10-25 VITALS — DIASTOLIC BLOOD PRESSURE: 55 MMHG | SYSTOLIC BLOOD PRESSURE: 105 MMHG

## 2018-10-25 VITALS — SYSTOLIC BLOOD PRESSURE: 115 MMHG | DIASTOLIC BLOOD PRESSURE: 55 MMHG

## 2018-10-25 VITALS — SYSTOLIC BLOOD PRESSURE: 103 MMHG | DIASTOLIC BLOOD PRESSURE: 49 MMHG

## 2018-10-25 VITALS — DIASTOLIC BLOOD PRESSURE: 58 MMHG | SYSTOLIC BLOOD PRESSURE: 118 MMHG

## 2018-10-25 VITALS — SYSTOLIC BLOOD PRESSURE: 137 MMHG | DIASTOLIC BLOOD PRESSURE: 59 MMHG

## 2018-10-25 LAB
ALBUMIN SERPL-MCNC: 3.2 GM/DL (ref 3.2–4.5)
ALP SERPL-CCNC: 286 U/L (ref 40–136)
ALT SERPL-CCNC: 151 U/L (ref 0–55)
BASOPHILS # BLD AUTO: 0 10^3/UL (ref 0–0.1)
BASOPHILS NFR BLD AUTO: 0 % (ref 0–10)
BILIRUB SERPL-MCNC: 0.5 MG/DL (ref 0.1–1)
BUN/CREAT SERPL: 32
CALCIUM SERPL-MCNC: 8.6 MG/DL (ref 8.5–10.1)
CHLORIDE SERPL-SCNC: 104 MMOL/L (ref 98–107)
CO2 SERPL-SCNC: 24 MMOL/L (ref 21–32)
CREAT SERPL-MCNC: 0.59 MG/DL (ref 0.6–1.3)
EOSINOPHIL # BLD AUTO: 0 10^3/UL (ref 0–0.3)
EOSINOPHIL NFR BLD AUTO: 0 % (ref 0–10)
ERYTHROCYTE [DISTWIDTH] IN BLOOD BY AUTOMATED COUNT: 15.6 % (ref 10–14.5)
GFR SERPLBLD BASED ON 1.73 SQ M-ARVRAT: > 60 ML/MIN
GLUCOSE SERPL-MCNC: 129 MG/DL (ref 70–105)
HCT VFR BLD CALC: 21 % (ref 35–52)
HGB BLD-MCNC: 6.9 G/DL (ref 11.5–16)
LYMPHOCYTES # BLD AUTO: 0.8 X 10^3 (ref 1–4)
LYMPHOCYTES NFR BLD AUTO: 11 % (ref 12–44)
MANUAL DIFFERENTIAL PERFORMED BLD QL: NO
MCH RBC QN AUTO: 31 PG (ref 25–34)
MCHC RBC AUTO-ENTMCNC: 32 G/DL (ref 32–36)
MCV RBC AUTO: 96 FL (ref 80–99)
MONOCYTES # BLD AUTO: 0.2 X 10^3 (ref 0–1)
MONOCYTES NFR BLD AUTO: 3 % (ref 0–12)
NEUTROPHILS # BLD AUTO: 6.4 X 10^3 (ref 1.8–7.8)
NEUTROPHILS NFR BLD AUTO: 86 % (ref 42–75)
PLATELET # BLD: 155 10^3/UL (ref 130–400)
PMV BLD AUTO: 10.8 FL (ref 7.4–10.4)
POTASSIUM SERPL-SCNC: 5 MMOL/L (ref 3.6–5)
PROT SERPL-MCNC: 5.5 GM/DL (ref 6.4–8.2)
RBC # BLD AUTO: 2.23 10^6/UL (ref 4.35–5.85)
SODIUM SERPL-SCNC: 137 MMOL/L (ref 135–145)
WBC # BLD AUTO: 7.4 10^3/UL (ref 4.3–11)

## 2018-10-25 RX ADMIN — DOCUSATE SODIUM AND SENNOSIDES SCH EA: 8.6; 5 TABLET, FILM COATED ORAL at 18:30

## 2018-10-25 RX ADMIN — HYDROCODONE BITARTRATE AND ACETAMINOPHEN PRN EA: 10; 325 TABLET ORAL at 20:24

## 2018-10-25 RX ADMIN — CEFAZOLIN SODIUM SCH MLS/HR: 2 SOLUTION INTRAVENOUS at 00:35

## 2018-10-25 RX ADMIN — CARVEDILOL SCH ML: 25 TABLET, FILM COATED ORAL at 15:05

## 2018-10-25 RX ADMIN — ASPIRIN SCH MG: 325 TABLET, COATED ORAL at 08:23

## 2018-10-25 RX ADMIN — HYDROCODONE BITARTRATE AND ACETAMINOPHEN PRN EA: 10; 325 TABLET ORAL at 11:48

## 2018-10-25 RX ADMIN — LEVOTHYROXINE SODIUM SCH MCG: 75 TABLET ORAL at 11:12

## 2018-10-25 RX ADMIN — DIPHENHYDRAMINE HYDROCHLORIDE PRN MG: 50 INJECTION INTRAMUSCULAR; INTRAVENOUS at 01:18

## 2018-10-25 RX ADMIN — Medication SCH ML: at 06:39

## 2018-10-25 RX ADMIN — ASPIRIN SCH MG: 325 TABLET, COATED ORAL at 10:39

## 2018-10-25 RX ADMIN — FENTANYL CITRATE PRN MCG: 50 INJECTION, SOLUTION INTRAMUSCULAR; INTRAVENOUS at 08:24

## 2018-10-25 RX ADMIN — FENTANYL CITRATE PRN MCG: 50 INJECTION, SOLUTION INTRAMUSCULAR; INTRAVENOUS at 17:18

## 2018-10-25 RX ADMIN — DICYCLOMINE HYDROCHLORIDE SCH EA: 20 TABLET ORAL at 18:40

## 2018-10-25 RX ADMIN — FENTANYL CITRATE PRN MCG: 50 INJECTION, SOLUTION INTRAMUSCULAR; INTRAVENOUS at 01:02

## 2018-10-25 RX ADMIN — ENOXAPARIN SODIUM SCH MG: 100 INJECTION SUBCUTANEOUS at 08:24

## 2018-10-25 RX ADMIN — Medication SCH ML: at 22:59

## 2018-10-25 RX ADMIN — SODIUM CHLORIDE, SODIUM LACTATE, POTASSIUM CHLORIDE, AND CALCIUM CHLORIDE SCH MLS/HR: 600; 310; 30; 20 INJECTION, SOLUTION INTRAVENOUS at 14:49

## 2018-10-25 RX ADMIN — DICYCLOMINE HYDROCHLORIDE SCH EA: 20 TABLET ORAL at 15:02

## 2018-10-25 RX ADMIN — Medication SCH ML: at 14:51

## 2018-10-25 RX ADMIN — LORATADINE SCH MG: 10 TABLET ORAL at 11:29

## 2018-10-25 RX ADMIN — CEFAZOLIN SODIUM SCH MLS/HR: 2 SOLUTION INTRAVENOUS at 08:27

## 2018-10-25 NOTE — PHYSICAL THERAPY DAILY NOTE
PT Daily Note-Current


Subjective


Pt was up in chair when PT arrived. Patient stated that she needed to transfer 

to commode before returning to bed.





Mental Status


Patient Orientation:  Normal For Age


Attachments:  PEG Tube, IV





Transfers


              Functional [place] Measure


0=Not Assessed/NA   4=Minimal Assistance


1=Total Assistance   5=Supervision or Setup


2=Maximal Assistance   6=Modified [place]


3=Moderate Assistance   7=Complete IndependenceIRFPAI Quality Coding Scale











6 Independent with activity with or without an assistive device


 


5  Patient requires set up or clean up by helper.  Patient completes activity  

by  themselves


 


4 Supervision or touching assist (CGA). [place] provide cues , steadying assist


 


3 The helper provides less than half the effort to complete the activity


 


2 The helper provides more than half the effort to complete the activity


 


1 Dependent.  The helper does all the effort to complete an activity 


 


7 Patient refused to complete or attempt activity


 


9 The patient did not perform the activity before the current illness or injury


 


88 Not attempted due to Medical conditions or safety concerns








Transfers (B, C, W/C) (FIM):  1


Scootin


Rollin


Supine to/from Sit:  1


Sit to/from Stand:  2


Pt was able to SPT dependent of 2 and retropulsive x 2 sets/patient is also 

dependent with all bed mobility and requires time to complete all functional 

tasks due to left LE pain and weakness.





Weight Bearing


Right Lower Extremity:  Right


Full Weight Bearing


Left Lower Extremity:  Left


Touch Toe Bearing





Assessment


Patient was dependent in SPT throughout duration from chair to commode to bed. 

Patient was dependent in transfer from sitting bedside to supine in bed. 

Patient will continue to benefit from therapy to improve strength and transfer 

techniques for functional activities. From PT standpoint, patient would benefit 

from extended care facility to ensure proper/appropriate healing.





PT Short Term Goals


Short Term Goals


Transfers (B,C,W/C) (FIM):  4





PT Long Term Goals


Long Term Goals


PT Long Term Goals Time Frame:  Oct 31, 2018


Transfers (B,C,W/C) (FIM):  4


Gait (FIM):  1


Gait distance (FIM):  1=up to 49 ft (20)


Distance:  20'


Gait Level of Assist:  4


Gait Assistive Device:  FWW





PT Plan


Problem List


Problem List:  Functional Strength, Balance, Transfer





Treatment/Plan


Treatment Plan:  Continue Plan of Care


Treatment Plan:  Bed Mobility, Education, Functional Strength, Group Therapy, 

Gait, Safety, Therapeutic Exercise, Transfers


Treatment Duration:  Oct 31, 2018


Frequency:  11 times per week


Estimated Hrs Per Day:  .5 hour per day


Patient and/or Family Agrees t:  Yes





Safety Risks/Education


Patient Education:  Transfer Techniques, Correct Positioning





Discharge Recommendations


Therapy D/C Recommendations:  Nursing Home Placement, Skilled Nursing (TCU/NH)





Time/GCodes


Time In:  1310


Time Out:  1341


Total Billed Treatment Time:  31


Total Billed Treatment


1 visit


FA x 2 - 31mins











ROCHELLE ACOSTA PT Oct 25, 2018 14:24

## 2018-10-25 NOTE — PHYSICAL THERAPY EVALUATION
PT Evaluation-General


Medical Diagnosis


Admission Date


Oct 24, 2018 at 10:44


Medical Diagnosis:  left hip fracture


Onset Date:  Oct 24, 2018





Therapy Diagnosis


Therapy Diagnosis:  generalized weakness/debility





Height/Weight


Height (Feet):  5


Height (Inches):  2.00


Weight (Pounds):  113


Weight (Ounces):  7.0





Precautions


Precautions/Isolations:  Fall Prevention, Standard Precautions





Weight Bear Status


Right Lower Extremity:  Right


Full Weight Bearing


Left Lower Extremity:  Left


Touch Toe Bearing





Referral


Physician:  Leti


Reason for Referral:  Evaluation/Treatment





Medical History


Additional Medical History


pancreatic cancer and inclusion body myositis


Current History


attempted to ambulate without FWW, turned and fell


Reviewed History:  Yes





Social History


Current Living Status:  Other Family





Prior/Core FIM


Prior Level of Function


              Functional [place] Measure


0=Not Assessed/NA   4=Minimal Assistance


1=Total Assistance   5=Supervision or Setup


2=Maximal Assistance   6=Modified [place]


3=Moderate Assistance   7=Complete IndependenceIRFPAI Quality Coding Scale











6 Independent with activity with or without an assistive device


 


5  Patient requires set up or clean up by helper.  Patient completes activity  

by  themselves


 


4 Supervision or touching assist (CGA). [place] provide cues , steadying assist


 


3 The helper provides less than half the effort to complete the activity


 


2 The helper provides more than half the effort to complete the activity


 


1 Dependent.  The helper does all the effort to complete an activity 


 


7 Patient refused to complete or attempt activity


 


9 The patient did not perform the activity before the current illness or injury


 


88 Not attempted due to Medical conditions or safety concerns








Bed Mobility:  6


Transfers (B,C,W/C) (FIM):  6


Gait:  6


Stairs:  6


Prior Equipment Used:  


FWW, hospital bed





PT Evaluation-Current


Subjective


Patient was awake in bed when PT arrived. Pt agreed to transfer to bedside 

chair.





Pain





   Numeric Pain Scale:  8


   Location:  Left


   Location Body Site:  Hip


   Pain Description:  Acute


   Comment:  Pain issued prior to treatment by RN





Objective


Patient Orientation:  Normal For Age


Problem Solving:  Fair


Attachments:  PEG Tube, IV





ROM/Strength


ROM Upper Extremities


WNL


ROM Lower Extremities


WNL


Strength Upper Extremities


4/5 bilaterally


Strength Lower Extremities


Pt has 3/5 strength of right LE, L LE was not tested due to surgical procedure, 

however, prior drop foot L secondary to myositis





Integumentary/Posture


Integumentary


refer to nursing notes


Bowel Incontinence:  No


Bladder Incontinence:  No


Posture


WFL





Neuromuscular


(Tone, Coordination, Reflexes)


diminished coordination due to myositis





Sensory


Vision:  Functional


Hearing:  Functional


Sensation Right Upper Extremit:  Intact


Sensation Left Upper Extremity:  Intact


Sensation Right Lower Extremit:  Intact


Sensation Left Lower Extremity:  Intact





Transfers


              Functional [place] Measure


0=Not Assessed/NA   4=Minimal Assistance


1=Total Assistance   5=Supervision or Setup


2=Maximal Assistance   6=Modified [place]


3=Moderate Assistance   7=Complete [place]


Transfers (B, C, W/C) (FIM):  1


Scootin


Rollin


Supine to/from Sit:  1


Sit to/from Stand:  1


bed t/f WC(FIM only if WC use):  1


very resistive with all movement





Gait


Mode of Locomotion:  Walk


Anticipated Mode of Locomotion:  Both


Gait (FIM):  1


Distance (FIM):  1=up to 49 ft


Distance:  5'


Gait Level of Assist:  2


Gait Persons Needed:  1


Gait Assistive Device:  FWW


Comments/Gait Description


difficulty advancing right LE due to patient performing NWB left LE and noted 

weakness





Balance


Sitting Static:  Normal


Sitting Dynamic:  Normal


Standing Static:  Fair


 Standing Dynamic:  Poor





Assessment/Needs


78 y.o. female, will benefit from skilled PT to address functional strength and 

mobility to improve current LOF and to safely return to home with family at 

maximum LOF.


Rehab Potential:  Fair


Equipment Needs


Pt requires FWW for ambulation





PT Long Term Goals


Long Term Goals


PT Long Term Goals Time Frame:  Oct 31, 2018


Transfers (B,C,W/C) (FIM):  4


Gait (FIM):  1


Gait distance (FIM):  1=up to 49 ft (20)


Distance:  20'


Gait Level of Assist:  4


Gait Assistive Device:  FWW





PT Plan


Problem List


Problem List:  Activity Tolerance, Functional Strength, Safety, Balance, Gait, 

Transfer, Bed Mobility





Treatment/Plan


Treatment Plan:  Continue Plan of Care


Treatment Plan:  Bed Mobility, Education, Functional Strength, Group Therapy, 

Gait, Safety, Therapeutic Exercise, Transfers


Treatment Duration:  Oct 31, 2018


Frequency:  11 times per week


Estimated Hrs Per Day:  .5 hour per day


Patient and/or Family Agrees t:  Yes





Safety Risks/Education


Patient Education:  Gait Training, Transfer Techniques, Safety Issues





Time/GCodes


Time In:  815


Time Out:  845


Total Billed Treatment Time:  30


Total Billed Treatment


1 visit


[facility] - 30 mins











ROCHELLE ACOSTA PT Oct 25, 2018 09:26

## 2018-10-25 NOTE — PROGRESS NOTE (SOAP)
Subjective


Date Seen by a Provider:  Oct 25, 2018


Time Seen by a Provider:  07:18


Subjective/Events-last exam


POD 1 s/p IM nailing of left hip fracture, she has no complaints this morning, 

she currently states pain is minimal





Objective


Exam





Vital Signs








  Date Time  Temp Pulse Resp B/P (MAP) Pulse Ox O2 Delivery O2 Flow Rate FiO2


 


10/25/18 04:00 97.9 87 18 118/58 (78) 98 Room Air  


 


10/25/18 00:51 98.7 101 16 137/59 (85) 98 Room Air  


 


10/24/18 22:00 97.6 98 18 139/64 (89) 97 Room Air  


 


10/24/18 16:10 98.7 82 18 106/53 (70) 97 Room Air  


 


10/24/18 14:59     99 Room Air  


 


10/24/18 12:33      Room Air  


 


10/24/18 12:25     98   


 


10/24/18 11:57 98.8 82 10 120/56 (77) 97 Room Air  


 


10/24/18 11:54     98 Room Air  


 


10/24/18 10:45 98.9 82 14 150/63 (92) 98 Room Air  














I & O 


 


 10/25/18





 07:00


 


Intake Total 2100 ml


 


Output Total 450 ml


 


Balance 1650 ml





Capillary Refill : Less Than 3 Seconds


General Appearance:  No Apparent Distress


Peripheral Pulses:  1+ Left Dors-Pedis (L)


Extremity:  Normal Capillary Refill, Normal Inspection, No Calf Tenderness, No 

Pedal Edema


Neurologic/Psychiatric:  Alert, Oriented x3, No Motor/Sensory Deficits, Normal 

Mood/Affect


Skin:  Normal Color, Warm/Dry (dressing CDI left hip with scant sang drainage)





Results


Lab


Laboratory Tests


10/25/18 05:35: 


White Blood Count 7.4, Red Blood Count 2.23L, Hemoglobin 6.9#*L, Hematocrit 21L

, Mean Corpuscular Volume 96, Mean Corpuscular Hemoglobin 31, Mean Corpuscular 

Hemoglobin Concent 32, Red Cell Distribution Width 15.6H, Platelet Count 155, 

Mean Platelet Volume 10.8H, Neutrophils (%) (Auto) 86H, Lymphocytes (%) (Auto) 

11L, Monocytes (%) (Auto) 3, Eosinophils (%) (Auto) 0, Basophils (%) (Auto) 0, 

Neutrophils # (Auto) 6.4, Lymphocytes # (Auto) 0.8L, Monocytes # (Auto) 0.2, 

Eosinophils # (Auto) 0.0, Basophils # (Auto) 0.0, Sodium Level 137, Potassium 

Level 5.0, Chloride Level 104, Carbon Dioxide Level 24, Anion Gap 9, Blood Urea 

Nitrogen 19H, Creatinine 0.59L, Estimat Glomerular Filtration Rate > 60, BUN/

Creatinine Ratio 32, Glucose Level 129H, Calcium Level 8.6, Corrected Calcium 

9.2, Total Bilirubin 0.5, Aspartate Amino Transf (AST/SGOT) 73H, Alanine 

Aminotransferase (ALT/SGPT) 151H, Alkaline Phosphatase 286H, Total Protein 5.5L

, Albumin 3.2





Assessment/Plan


Assessment/Plan


Assess & Plan/Chief Complaint


A: displaced traumatic intertrochanteric/subtrochanteric fracture of left hip


hx of pancreatic cancer


s/p IM nailing of left hip fracture





P: She may need a blood transfusion today, Dr. Lockwood to manage medically. May be 

up in chair today and work with therapy with transfers and TTWB. Discharge 

planning for Tomorrow or Saturday





Clinical Quality Measures


DVT/VTE Risk/Contraindication:


Risk Factor Score Per Nursing:  15


RFS Level Per Nursing on Admit:  4+=Very High











SUZAN BOTELLO Oct 25, 2018 7:21 am

## 2018-10-25 NOTE — OCCUPATIONAL THERAPY EVAL
OT Evaluation-General/PLF


Medical Diagnosis


Admission Date


Oct 24, 2018 at 10:44


Medical Diagnosis:  left hip fracture/IM nailing


Onset Date:  Oct 24, 2018





Therapy Diagnosis


Therapy Diagnosis:  Decreased ADL skills





Height/Weight


Height (Feet):  5


Height (Inches):  2.00


Weight (Pounds):  113


Weight (Ounces):  7.0





Precautions


Precautions/Isolations:  Fall Prevention, Standard Precautions


Safety Interventions:  Reorient-PRN





Weight Bear Status


Weight Bearing Restriction:  Touch Toe Bearing


Location Restriction:  L LE





Referral


Physician:  Leti Pendleton Reason:  Activity Tolerance, Self Care, Evaluation/Treatment, 

Strengthening/ROM





Medical History


Additional Medical History


Pancreatic cancer, chemotherapy


Current History


Pt. states that she tripped on her walker and fell.  Sustained a hip fx.


Reviewed History:  Yes





Social History


Home:  Single Level


Current Living Status:  Other Family


Entry Into Home:  Level Entry


Pt. is going to stay with her daughter in [place].  States that her daughter has 

a son who is a quadriplegic, and so her house is fully accessible.





ADL-Prior Level of Function


              Functional [place] Measure


0=Not Assessed/NA   4=Minimal Assistance


1=Total Assistance   5=Supervision or Setup


2=Maximal Assistance   6=Modified [place]


3=Moderate Assistance   7=Complete [place]


ADL PLOF Comments


Pt. states that she was fully independent with all daily tasks previous to this 

hospitalization.


Self Care


Self Care: (Code the patient's need for assistance with bathing, dressing, 

using the toilet, or eating prior to the current illness, exacerbation, or 

injury.)


Functional Cognition


Functional Cognition: (Code the patient's need for assistance with planning 

regular tasks, such as shopping or remembering to take medicaiton prior to the 

current illness, exacerbation, or injury.)


DME/Equipment:  Bath Chair, Shower


DME/Equipment Comments


Pt. states that she has a walker, wheelchair, full roll in shower, shower chair

, and any other equipment needed.  Her daughter's home is fully set up.





OT Current Status


Subjective


Pt. states, "I do not have pain as long as I sit here and don't move."





Appearance


Pt. is up in chair.  Receiving blood at this time.  Has been up for an hour.





Mental Status/Objective


Patient Orientation:  Person, Place, Time, Situation


Attachments:  PEG Tube





Current


Glasses/Contacts:  Yes


Upper Extremity ROM


WFL





ADL-Treatment


              Functional [place] Measure


0=Not Assessed/NA   4=Minimal Assistance


1=Total Assistance   5=Supervision or Setup


2=Maximal Assistance   6=Modified [place]


3=Moderate Assistance   7=Complete IndependenceIRFPAI Quality Coding Scale











6 Independent with activity with or without an assistive device


 


5  Patient requires set up or clean up by helper.  Patient completes activity  

by  themselves


 


4 Supervision or touching assist (CGA). [place] provide cues , steadying assist


 


3 The helper provides less than half the effort to complete the activity


 


2 The helper provides more than half the effort to complete the activity


 


1 Dependent.  The helper does all the effort to complete an activity 


 


7 Patient refused to complete or attempt activity


 


9 The patient did not perform the activity before the current illness or injury


 


88 Not attempted due to Medical conditions or safety concerns








Lower Body Dressing (FIM):  1 (Pt. is unable to reach her feet while sitting in 

chair.  Unable to doff socks.)





Other Treatments


Pt. up in chair.  OT educates her on purpose of therapy.  Pt. getting blood and 

states that she has a plan.  Plans to finish her blood, get a pain pill, 

transfer to the BSC, and then back to bed.  Pt. is very specific with this 

plan.  OT offers to assist her to BSC now, or to bed now.  Pt. declines and 

states that she is comfortable and doesn't want to move at this time.  Pt. and 

OT talk about pt's set up for home and needs for home.  Pt. states that she 

will be staying with her daughter, who has a home fully handicap accessible.  

Pt. also states that she receives her meals through a PEG tube due to a 

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  Pt. is educated regarding 

ADL equipment and need for increased independence.  Pt. verbalizes 

understanding.  All needs met.





Education


OT Patient Education:  Correct positioning, Modified ADL techniques, Progress 

toward Goal/Update tx plan, Purpose of tx/functional activities, Reviewed 

precautions, Rehab process, Transfer techniques, Use of adapted equipment


Teaching Recipient:  Patient


Teaching Methods:  Demonstration, Discussion


Response to Teaching:  Verbalize Understanding, Return Demonstration





OT Short Term Goals


Short Term Goals


Time Frame:  Nov 1, 2018


Grooming(FIM):  5


Bathing(FIM):  4


Upper Body Dressing(FIM):  4


Lower Body Dressing(FIM):  4


Toileting(FIM):  4


Transfers (B,C,W/C) (FIM):  4


Toilet/Commode Transfer(FIM):  4


Additional Short Term Goals:  1-Demonstrate ADL Tasks, 2-Verbalize Understanding

, 3-ImproveStrength/Andres


1=Demonstrate adherence to instructed precautions during ADL tasks.


2=Patient will verbalize/demonstrate understanding of assistive devices/

modifications for ADL.


3=Patient will improve strength/tolerance for activity to enable patient to 

perform ADL's.





OT Long Term Goals


Long Term Goals


Time Frame:  Nov 8, 2018


Grooming(FIM):  6


Bathing(FIM):  5


Upper Body Dressing(FIM):  6


Lower Body Dressing(FIM):  5


Toileting(FIM):  6


Transfers (B,C,W/C) (FIM):  6


Toilet/Commode Transfer(FIM):  6


Shower Transfer(FIM):  5


Additional Goals:  1-Demonstrate ADL Tasks, 2-Verbalize Understanding, 3-

ImproveStrength/Andres


1=Demonstrate adherence to instructed precautions during ADL tasks.


2=Patient will verbalize/demonstrate understanding of assistive devices/

modifications for ADL.


3=Patient will improve strength/tolerance for activity to enable patient to 

perform ADL's.





OT Education/Plan


Problem List/Assessment


Assessment:  Decreased Activ Tolerance, Impaired I ADL's, Impaired Self-Care 

Skills





Discharge Recommendations


Plan/Recommendations:  Continue POC


Therapy D/C Recommendations:  Home w/ Family Support, Occupational Therapy Home 

Care


Comment


Pt. states that she has all needed equipment and that her daughter has a fully 

accessible home.





Treatment Plan/Plan of Care


Treatment,Training & Education:  Yes


Patient would benefit from OT for education, treatment and training to promote 

independence in ADL's, mobility, safety and/or upper extremity function for ADL'

s.


Plan of Care:  ADL Retraining, Functional Mobility, UE Funct Exercise/Act


Treatment Duration:  Nov 8, 2018


Frequency:  5 times per week


Estimated Hrs Per Day:  .25 hour per day


Agreement:  Yes


Rehab Potential:  Fair





Time/GCodes


Start Time:  11:05


Stop Time:  11:23


Total Time Billed (hr/min):  18


Billed Treatment Time


1, ELAYNE DUFFISABEL OT Oct 25, 2018 13:49

## 2018-10-25 NOTE — PROGRESS NOTE-HOSPITALIST
Subjective


HPI/CC On Admission


Date Seen by Provider:  Oct 25, 2018


Time Seen by Provider:  08:24


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 

presented to outside ER for evaluation after a fall. She states she attempted 

to walk without her walker this morning and stumbled over her feet and fell. 

She is currently living with her brother and sister in law and her sister in 

law found her shortly after the fall and called 911. [place] [facility] [place] found to have a left intertrochanteric hip fracture. She was 

transferred here for surgical evaluation. She denies any complaints at this 

time but thinks he pain is increasing as the medicine wears off. She notes a 

history of complication follow port placement when she received Versed at . 

She states she "crashed" and believes she was coded but does not know all of 

the details.


Subjective/Events-last exam


Pt reports doing well. Pain well controlled. Ready for therapy.





Objective


Exam


Vital Signs





Vital Signs








  Date Time  Temp Pulse Resp B/P (MAP) Pulse Ox O2 Delivery O2 Flow Rate FiO2


 


10/26/18 04:00 98.9 94 18 112/56 (74) 98 Room Air  





Capillary Refill : Less Than 3 Seconds


General Appearance:  No Apparent Distress, Chronically ill, Thin


Respiratory:  Lungs Clear, No Respiratory Distress


Cardiovascular:  Regular Rate, Rhythm, No Murmur


Gastrointestinal:  Normal Bowel Sounds, Non Tender, Soft


Extremity:  No Calf Tenderness, No Pedal Edema


Neurologic/Psychiatric:  Alert, Oriented x3, Normal Mood/Affect





Results/Procedures


Lab


Laboratory Tests


10/26/18 05:45








Patient resulted labs reviewed.


Imaging:  Reviewed Imaging Report





Assessment/Plan


Assessment and Plan


Assess & Plan/Chief Complaint


Hip fracture





Diagnosis/Problems


Diagnosis/Problems





(1) Intertrochanteric fracture of left hip


Status:  Acute


Assessment & Plan:  Ortho consulted, appreciate recs


POD #1


PT/OT


IRU consulted, appreciate recs


Qualifiers:  


   Encounter type:  initial encounter  Fracture type:  closed  Fracture 

alignment:  displaced  Qualified Codes:  S72.142A - Displaced intertrochanteric 

fracture of left femur, initial encounter for closed fracture


(2) Normocytic anemia


Status:  Acute


Assessment & Plan:  Hgb 6.9 today


Likely due to surgery and recent chemo


Discussed with patient- will transfuse 1 unit





(3) Pancreatic cancer


Status:  Chronic


Assessment & Plan:  Follows with Dr Edwards


Received chemo 10/23


Oncology consulted, appreciate recs


Qualifiers:  


   Pancreatic malignancy location:  head of pancreas  Qualified Codes:  C25.0 - 

Malignant neoplasm of head of pancreas


(4) Inclusion body myositis


Assessment & Plan:  PT/OT 





(5) Transaminitis


Assessment & Plan:  Markedly improved


Trend








Clinical Quality Measures


DVT/VTE Risk/Contraindication:


Risk Factor Score Per Nursing:  15


RFS Level Per Nursing on Admit:  4+=Very High











RICKEY SOLANO MD Oct 25, 2018 8:27 am

## 2018-10-25 NOTE — ANESTHESIA-GENERAL POST-OP
General


Patient Condition


Mental Status/LOC:  Same as Preop


Cardiovascular:  Satisfactory


Nausea/Vomiting:  Absent


Respiratory:  Satisfactory


Pain:  Controlled


Complications:  Absent





Post Op Complications


Complications


None





Follow Up Care/Instructions


Patient Instructions


None needed.





Anesthesia/Patient Condition


Patient Condition


Patient is doing well, no complaints, stable vital signs, no apparent adverse 

anesthesia problems.   


No complications reported per nursing.











ERICA TAFOYA CRNA Oct 25, 2018 07:07

## 2018-10-26 VITALS — SYSTOLIC BLOOD PRESSURE: 108 MMHG | DIASTOLIC BLOOD PRESSURE: 53 MMHG

## 2018-10-26 VITALS — SYSTOLIC BLOOD PRESSURE: 112 MMHG | DIASTOLIC BLOOD PRESSURE: 56 MMHG

## 2018-10-26 VITALS — DIASTOLIC BLOOD PRESSURE: 51 MMHG | SYSTOLIC BLOOD PRESSURE: 113 MMHG

## 2018-10-26 VITALS — DIASTOLIC BLOOD PRESSURE: 56 MMHG | SYSTOLIC BLOOD PRESSURE: 112 MMHG

## 2018-10-26 VITALS — DIASTOLIC BLOOD PRESSURE: 51 MMHG | SYSTOLIC BLOOD PRESSURE: 107 MMHG

## 2018-10-26 VITALS — SYSTOLIC BLOOD PRESSURE: 104 MMHG | DIASTOLIC BLOOD PRESSURE: 54 MMHG

## 2018-10-26 VITALS — DIASTOLIC BLOOD PRESSURE: 65 MMHG | SYSTOLIC BLOOD PRESSURE: 106 MMHG

## 2018-10-26 VITALS — DIASTOLIC BLOOD PRESSURE: 50 MMHG | SYSTOLIC BLOOD PRESSURE: 97 MMHG

## 2018-10-26 VITALS — DIASTOLIC BLOOD PRESSURE: 54 MMHG | SYSTOLIC BLOOD PRESSURE: 121 MMHG

## 2018-10-26 LAB
BASOPHILS # BLD AUTO: 0 10^3/UL (ref 0–0.1)
BASOPHILS NFR BLD AUTO: 0 % (ref 0–10)
BUN/CREAT SERPL: 40
CALCIUM SERPL-MCNC: 8.2 MG/DL (ref 8.5–10.1)
CHLORIDE SERPL-SCNC: 105 MMOL/L (ref 98–107)
CO2 SERPL-SCNC: 25 MMOL/L (ref 21–32)
CREAT SERPL-MCNC: 0.5 MG/DL (ref 0.6–1.3)
EOSINOPHIL # BLD AUTO: 0 10^3/UL (ref 0–0.3)
EOSINOPHIL NFR BLD AUTO: 1 % (ref 0–10)
ERYTHROCYTE [DISTWIDTH] IN BLOOD BY AUTOMATED COUNT: 17.5 % (ref 10–14.5)
GFR SERPLBLD BASED ON 1.73 SQ M-ARVRAT: > 60 ML/MIN
GLUCOSE SERPL-MCNC: 93 MG/DL (ref 70–105)
HCT VFR BLD CALC: 20 % (ref 35–52)
HGB BLD-MCNC: 6.4 G/DL (ref 11.5–16)
LYMPHOCYTES # BLD AUTO: 1.3 X 10^3 (ref 1–4)
LYMPHOCYTES NFR BLD AUTO: 27 % (ref 12–44)
MANUAL DIFFERENTIAL PERFORMED BLD QL: NO
MCH RBC QN AUTO: 30 PG (ref 25–34)
MCHC RBC AUTO-ENTMCNC: 33 G/DL (ref 32–36)
MCV RBC AUTO: 92 FL (ref 80–99)
MONOCYTES # BLD AUTO: 0 X 10^3 (ref 0–1)
MONOCYTES NFR BLD AUTO: 1 % (ref 0–12)
NEUTROPHILS # BLD AUTO: 3.3 X 10^3 (ref 1.8–7.8)
NEUTROPHILS NFR BLD AUTO: 72 % (ref 42–75)
PLATELET # BLD: 131 10^3/UL (ref 130–400)
PMV BLD AUTO: 10.8 FL (ref 7.4–10.4)
POTASSIUM SERPL-SCNC: 4.3 MMOL/L (ref 3.6–5)
RBC # BLD AUTO: 2.12 10^6/UL (ref 4.35–5.85)
SODIUM SERPL-SCNC: 137 MMOL/L (ref 135–145)
WBC # BLD AUTO: 4.7 10^3/UL (ref 4.3–11)

## 2018-10-26 RX ADMIN — Medication SCH ML: at 05:06

## 2018-10-26 RX ADMIN — DICYCLOMINE HYDROCHLORIDE SCH EA: 20 TABLET ORAL at 07:40

## 2018-10-26 RX ADMIN — FENTANYL CITRATE PRN MCG: 50 INJECTION, SOLUTION INTRAMUSCULAR; INTRAVENOUS at 19:55

## 2018-10-26 RX ADMIN — DICYCLOMINE HYDROCHLORIDE SCH EA: 20 TABLET ORAL at 16:05

## 2018-10-26 RX ADMIN — DICYCLOMINE HYDROCHLORIDE SCH EA: 20 TABLET ORAL at 18:52

## 2018-10-26 RX ADMIN — CARVEDILOL SCH ML: 25 TABLET, FILM COATED ORAL at 09:09

## 2018-10-26 RX ADMIN — FENTANYL CITRATE PRN MCG: 50 INJECTION, SOLUTION INTRAMUSCULAR; INTRAVENOUS at 13:42

## 2018-10-26 RX ADMIN — LEVOTHYROXINE SODIUM SCH MCG: 75 TABLET ORAL at 09:10

## 2018-10-26 RX ADMIN — ASPIRIN SCH MG: 325 TABLET, COATED ORAL at 09:08

## 2018-10-26 RX ADMIN — DICYCLOMINE HYDROCHLORIDE SCH EA: 20 TABLET ORAL at 11:33

## 2018-10-26 RX ADMIN — Medication SCH ML: at 14:21

## 2018-10-26 RX ADMIN — SODIUM CHLORIDE, SODIUM LACTATE, POTASSIUM CHLORIDE, AND CALCIUM CHLORIDE SCH MLS/HR: 600; 310; 30; 20 INJECTION, SOLUTION INTRAVENOUS at 00:25

## 2018-10-26 RX ADMIN — SODIUM CHLORIDE, SODIUM LACTATE, POTASSIUM CHLORIDE, AND CALCIUM CHLORIDE SCH MLS/HR: 600; 310; 30; 20 INJECTION, SOLUTION INTRAVENOUS at 21:57

## 2018-10-26 RX ADMIN — DOCUSATE SODIUM AND SENNOSIDES SCH EA: 8.6; 5 TABLET, FILM COATED ORAL at 21:56

## 2018-10-26 RX ADMIN — FENTANYL CITRATE PRN MCG: 50 INJECTION, SOLUTION INTRAMUSCULAR; INTRAVENOUS at 03:49

## 2018-10-26 RX ADMIN — FENTANYL CITRATE PRN MCG: 50 INJECTION, SOLUTION INTRAMUSCULAR; INTRAVENOUS at 09:08

## 2018-10-26 RX ADMIN — DOCUSATE SODIUM AND SENNOSIDES SCH EA: 8.6; 5 TABLET, FILM COATED ORAL at 09:07

## 2018-10-26 RX ADMIN — DIPHENHYDRAMINE HYDROCHLORIDE PRN MG: 50 INJECTION INTRAMUSCULAR; INTRAVENOUS at 00:25

## 2018-10-26 RX ADMIN — LORATADINE SCH MG: 10 TABLET ORAL at 09:07

## 2018-10-26 RX ADMIN — Medication SCH ML: at 21:57

## 2018-10-26 RX ADMIN — ENOXAPARIN SODIUM SCH MG: 100 INJECTION SUBCUTANEOUS at 09:08

## 2018-10-26 RX ADMIN — DOCUSATE SODIUM SCH MG: 100 CAPSULE ORAL at 17:16

## 2018-10-26 RX ADMIN — HYDROCODONE BITARTRATE AND ACETAMINOPHEN PRN EA: 10; 325 TABLET ORAL at 09:07

## 2018-10-26 RX ADMIN — ENOXAPARIN SODIUM SCH MG: 100 INJECTION SUBCUTANEOUS at 09:57

## 2018-10-26 NOTE — PHYSICAL THERAPY DAILY NOTE
PT Daily Note-Current


Subjective


Patient was awake in bed waiting on pain medicine from RN. Patient had been 

waiting for 30 mins to be transferred to commode from bed. Patient agreed to 

continue with therapy.





Pain





   Numeric Pain Scale:  8


   Location:  Left


   Location Body Site:  Hip





Mental Status


Patient Orientation:  Normal For Age


Attachments:  PEG Tube, IV





Transfers


              Functional [place] Measure


0=Not Assessed/NA   4=Minimal Assistance


1=Total Assistance   5=Supervision or Setup


2=Maximal Assistance   6=Modified [place]


3=Moderate Assistance   7=Complete IndependenceIRFPAI Quality Coding Scale











6 Independent with activity with or without an assistive device


 


5  Patient requires set up or clean up by helper.  Patient completes activity  

by  themselves


 


4 Supervision or touching assist (CGA). [place] provide cues , steadying assist


 


3 The helper provides less than half the effort to complete the activity


 


2 The helper provides more than half the effort to complete the activity


 


1 Dependent.  The helper does all the effort to complete an activity 


 


7 Patient refused to complete or attempt activity


 


9 The patient did not perform the activity before the current illness or injury


 


88 Not attempted due to Medical conditions or safety concerns








Transfers (B, C, W/C) (FIM):  1


Scootin


Rollin


Supine to/from Sit:  1


Sit to/from Stand:  1





Weight Bearing


Right Lower Extremity:  Right


Full Weight Bearing


Left Lower Extremity:  Left


Touch Toe Bearing





Exercises


Seated Therapy Exercises:  Ankle pumps, Long arc quads





Assessment


Ashley practiced bed mobility but needed assistance from two therapist. She 

was cued on hand and body positioning throughout treatment. Ashley was 

dependent assist during SPT from bed to commode to chair.  Patient will 

continue to benefit from skilled education and cueing to improve overall 

mobility with transfers.





PT Short Term Goals


Short Term Goals


Transfers (B,C,W/C) (FIM):  4





PT Long Term Goals


Long Term Goals


PT Long Term Goals Time Frame:  Oct 31, 2018


Transfers (B,C,W/C) (FIM):  4


Gait (FIM):  1


Gait distance (FIM):  1=up to 49 ft (20)


Distance:  20'


Gait Level of Assist:  4


Gait Assistive Device:  FWW





PT Plan


Problem List


Problem List:  Safety, Transfer, Bed Mobility





Treatment/Plan


Treatment Plan:  Continue Plan of Care


Treatment Plan:  Bed Mobility, Education, Functional Strength, Group Therapy, 

Gait, Safety, Therapeutic Exercise, Transfers


Treatment Duration:  Oct 31, 2018


Frequency:  11 times per week


Estimated Hrs Per Day:  .5 hour per day


Patient and/or Family Agrees t:  Yes





Safety Risks/Education


Patient Education:  Transfer Techniques, Correct Positioning





Time/GCodes


Time In:  905


Time Out:  928


Total Billed Treatment Time:  23


Total Billed Treatment


1 visit 


FA x 2 - 23mins











ROCHELLE ACOSTA PT Oct 26, 2018 09:35

## 2018-10-26 NOTE — OCCUPATIONAL THER DAILY NOTE
OT Current Status-Daily Note


Subjective


Pt sitting in chair, agrees to treatment. RN provided pain medication.





Mental Status/Objective


              Functional [place] Measure


0=Not Assessed/NA   4=Minimal Assistance


1=Total Assistance   5=Supervision or Setup


2=Maximal Assistance   6=Modified [place]


3=Moderate Assistance   7=Complete [place]


Attachments:  IV





ADL-Treatment


Pt completed grooming tasks while seated in chair. Pt brushed teeth and washed 

face with set up. Education provided regarding adaptive equipment for LE 

dressing. Pt states she is familiar with equipment, but doesn't feel she will 

need it for very long. Declined to attempt using equipment at this time. Pt 

states she will be going to [facility] at [place] early next week. Pt sitting in chair 

with needs met and PT present for treatment after session.





OT Short Term Goals


Short Term Goals


Time Frame:  Nov 1, 2018


Grooming(FIM):  5


Bathing(FIM):  4


Upper Body Dressing(FIM):  4


Lower Body Dressing(FIM):  4


Toileting(FIM):  4


Transfers (B,C,W/C) (FIM):  4


Toilet/Commode Transfer(FIM):  4


Additional Short Term Goals:  1-Demonstrate ADL Tasks, 2-Verbalize Understanding

, 3-ImproveStrength/Andres


1=Demonstrate adherence to instructed precautions during ADL tasks.


2=Patient will verbalize/demonstrate understanding of assistive devices/

modifications for ADL.


3=Patient will improve strength/tolerance for activity to enable patient to 

perform ADL's.





OT Long Term Goals


Long Term Goals


Time Frame:  Nov 8, 2018


Grooming(FIM):  6


Bathing(FIM):  5


Upper Body Dressing(FIM):  6


Lower Body Dressing(FIM):  5


Toileting(FIM):  6


Transfers (B,C,W/C) (FIM):  6


Toilet/Commode Transfer(FIM):  6


Shower Transfer(FIM):  5


Additional Goals:  1-Demonstrate ADL Tasks, 2-Verbalize Understanding, 3-

ImproveStrength/Andres


1=Demonstrate adherence to instructed precautions during ADL tasks.


2=Patient will verbalize/demonstrate understanding of assistive devices/

modifications for ADL.


3=Patient will improve strength/tolerance for activity to enable patient to 

perform ADL's.





OT Education/Plan


Discharge Recommendations


Plan/Recommendations:  Continue POC





Treatment Plan/Plan of Care


Patient would benefit from OT for education, treatment and training to promote 

independence in ADL's, mobility, safety and/or upper extremity function for ADL'

s.


Plan of Care:  ADL Retraining, Functional Mobility, UE Funct Exercise/Act


Treatment Duration:  Nov 8, 2018


Frequency:  5 times per week


Estimated Hrs Per Day:  .25 hour per day


Agreement:  Yes


Rehab Potential:  Fair





Time/GCodes


Start Time:  13:35


Stop Time:  13:55


Total Time Billed (hr/min):  20


Billed Treatment Time


1 visit, ADL(20minutes)











JCARLOS MONTOYA OT Oct 26, 2018 14:43

## 2018-10-26 NOTE — PROGRESS NOTE (SOAP)
Subjective


Date Seen by a Provider:  Oct 26, 2018


Time Seen by a Provider:  13:36


Subjective/Events-last exam


Currently she has no complaints, she states there is only mild pain in the left 

knee and hip. She did twist her knee when she fell. Under fluoro there was no 

fracture. She is tentatively planning on discharge to swing bed in [place].





Objective


Exam





Vital Signs








  Date Time  Temp Pulse Resp B/P (MAP) Pulse Ox O2 Delivery O2 Flow Rate FiO2


 


10/26/18 11:33 98.6       


 


10/26/18 10:59 99.9 98  108/53 97   


 


10/26/18 10:56 100.0       


 


10/26/18 10:41 99.9 96  107/51 88 Room Air 97.00 


 


10/26/18 08:00 98.6 99 18 113/51 (71) 97 Room Air  


 


10/26/18 04:00 98.9 94 18 112/56 (74) 98 Room Air  


 


10/26/18 00:00 97.7 81 16 104/54 (71) 95 Room Air  


 


10/25/18 20:00 98.4 94 18 103/49 (67) 97 Room Air  


 


10/25/18 16:00 99.1 90 18 125/58 (80) 98 Room Air  














I & O 


 


 10/26/18





 07:00


 


Intake Total 2840 ml


 


Output Total 750 ml


 


Balance 2090 ml





Capillary Refill : Less Than 3 Seconds


General Appearance:  No Apparent Distress


Extremity:  Normal Capillary Refill, No Calf Tenderness, No Pedal Edema, Other (

tenderness with 2+ effusion left knee, mild tenderness left hip)


Neurologic/Psychiatric:  Alert, Oriented x3, No Motor/Sensory Deficits, Normal 

Mood/Affect


Skin:  Normal Color, Warm/Dry (dressing left hip CDI)





Results


Lab


Laboratory Tests


10/26/18 05:45: 


White Blood Count 4.7, Red Blood Count 2.12L, Hemoglobin 6.4*L, Hematocrit 20*L

, Mean Corpuscular Volume 92, Mean Corpuscular Hemoglobin 30, Mean Corpuscular 

Hemoglobin Concent 33, Red Cell Distribution Width 17.5H, Platelet Count 131, 

Mean Platelet Volume 10.8H, Neutrophils (%) (Auto) 72, Lymphocytes (%) (Auto) 27

, Monocytes (%) (Auto) 1, Eosinophils (%) (Auto) 1, Basophils (%) (Auto) 0, 

Neutrophils # (Auto) 3.3, Lymphocytes # (Auto) 1.3, Monocytes # (Auto) 0.0, 

Eosinophils # (Auto) 0.0, Basophils # (Auto) 0.0, Sodium Level 137, Potassium 

Level 4.3, Chloride Level 105, Carbon Dioxide Level 25, Anion Gap 7, Blood Urea 

Nitrogen 20H, Creatinine 0.50L, Estimat Glomerular Filtration Rate > 60, BUN/

Creatinine Ratio 40, Glucose Level 93, Calcium Level 8.2L


10/26/18 11:46: Lab Scanned Report Transfusion Reaction Form





Microbiology


10/24/18 MRSA Screen - Final, Complete


           MRSA not isolated





Assessment/Plan


Assessment/Plan


Assess & Plan/Chief Complaint


A: displaced traumatic intertrochanteric/subtrochanteric fracture of left hip


hx of pancreatic cancer


s/p IM nailing of left hip fracture


left knee effusion secondary to fall





P: Ortho signing off at this point. She needs staples removed next Friday. She 

needs follow up with Leti in 2-3 weeks. Cont TTWB only with walker





Clinical Quality Measures


DVT/VTE Risk/Contraindication:


Risk Factor Score Per Nursing:  15


RFS Level Per Nursing on Admit:  4+=Very High











SUZAN BOTELLO APRN Oct 26, 2018 1:39 pm

## 2018-10-26 NOTE — PROGRESS NOTE-HOSPITALIST
Subjective


HPI/CC On Admission


Date Seen by Provider:  Oct 26, 2018


Time Seen by Provider:  10:30


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 

presented to outside ER for evaluation after a fall. She states she attempted 

to walk without her walker this morning and stumbled over her feet and fell. 

She is currently living with her brother and sister in law and her sister in 

law found her shortly after the fall and called 911. She was brought to the ER 

at [facility] and was found to have a left intertrochanteric hip fracture. She was 

transferred here for surgical evaluation. She denies any complaints at this 

time but thinks he pain is increasing as the medicine wears off. She notes a 

history of complication follow port placement when she received Versed at . 

She states she "crashed" and believes she was coded but does not know all of 

the details.


Subjective/Events-last exam


Pt reports doing well. Pain improving but still presenting. Working with PT. 

Hgb low again this AM.





Objective


Exam


Vital Signs





Vital Signs








  Date Time  Temp Pulse Resp B/P (MAP) Pulse Ox O2 Delivery O2 Flow Rate FiO2


 


10/26/18 04:00 98.9 94 18 112/56 (74) 98 Room Air  





Capillary Refill : Less Than 3 Seconds


General Appearance:  No Apparent Distress, WD/WN


Respiratory:  Lungs Clear, No Respiratory Distress


Cardiovascular:  Regular Rate, Rhythm, No Murmur


Gastrointestinal:  Normal Bowel Sounds, Non Tender, Soft


Extremity:  Other (bessie hose in place)


Neurologic/Psychiatric:  Alert, Oriented x3





Results/Procedures


Lab


Laboratory Tests


10/26/18 05:45








Patient resulted labs reviewed.


Imaging:  Reviewed Imaging Report





Assessment/Plan


Assessment and Plan


Assess & Plan/Chief Complaint


Hip fracture





Diagnosis/Problems


Diagnosis/Problems





(1) Intertrochanteric fracture of left hip


Status:  Acute


Assessment & Plan:  Ortho consulted, appreciate recs


POD #2


PT/OT


IRU consulted, appreciate recs


Qualifiers:  


   Encounter type:  initial encounter  Fracture type:  closed  Fracture 

alignment:  displaced  Qualified Codes:  S72.142A - Displaced intertrochanteric 

fracture of left femur, initial encounter for closed fracture


(2) Normocytic anemia


Status:  Acute


Assessment & Plan:  Hgb 6.4 this AM


Likely multifactorial due to surgery and recent chemo


Transfuse another unit pRBCs





(3) Pancreatic cancer


Status:  Chronic


Assessment & Plan:  Follows with Dr Edwards


Received chemo 10/23


Oncology consulted, appreciate recs


Qualifiers:  


   Pancreatic malignancy location:  head of pancreas  Qualified Codes:  C25.0 - 

Malignant neoplasm of head of pancreas


(4) Inclusion body myositis


Assessment & Plan:  PT/OT 





(5) Transaminitis


Assessment & Plan:  Markedly improved


Trend








Clinical Quality Measures


DVT/VTE Risk/Contraindication:


Risk Factor Score Per Nursing:  15


RFS Level Per Nursing on Admit:  4+=Very High











RICKEY SOLANO MD Oct 26, 2018 10:33 am

## 2018-10-26 NOTE — PHYSICAL THERAPY DAILY NOTE
PT Daily Note-Current


Subjective


Patient was awake in chair visiting with family when PT arrived. Pt asked to be 

transferred to commode before she returned to bed to start LE exercises.





Pain





   Numeric Pain Scale:  0-No Pain


   Location:  No Pain Reported





Mental Status


Patient Orientation:  Normal For Age


Attachments:  PEG Tube, IV





Transfers


              Functional [place] Measure


0=Not Assessed/NA   4=Minimal Assistance


1=Total Assistance   5=Supervision or Setup


2=Maximal Assistance   6=Modified [place]


3=Moderate Assistance   7=Complete IndependenceIRFPAI Quality Coding Scale











6 Independent with activity with or without an assistive device


 


5  Patient requires set up or clean up by helper.  Patient completes activity  

by  themselves


 


4 Supervision or touching assist (CGA). [place] provide cues , steadying assist


 


3 The helper provides less than half the effort to complete the activity


 


2 The helper provides more than half the effort to complete the activity


 


1 Dependent.  The helper does all the effort to complete an activity 


 


7 Patient refused to complete or attempt activity


 


9 The patient did not perform the activity before the current illness or injury


 


88 Not attempted due to Medical conditions or safety concerns








Transfers (B, C, W/C) (FIM):  2


Scootin


Rollin


Supine to/from Sit:  2


Sit to/from Stand:  2


Bed to/from Chair:  2





Weight Bearing


Right Lower Extremity:  Right


Full Weight Bearing


Left Lower Extremity:  Left


Touch Toe Bearing





Exercises


Supine Ex:  Ankle pumps, Quad Set, Heel Slides, Scooting


Supine Reps:  10





Assessment


Pt requires max assistance with SPT from chair to commode to bed.  Pt will 

continue with strengthening to improve bed mobility and continue working on ROM.





PT Short Term Goals


Short Term Goals


Transfers (B,C,W/C) (FIM):  4





PT Long Term Goals


Long Term Goals


PT Long Term Goals Time Frame:  Oct 31, 2018


Transfers (B,C,W/C) (FIM):  4


Gait (FIM):  1


Gait distance (FIM):  1=up to 49 ft (20)


Distance:  20'


Gait Level of Assist:  4


Gait Assistive Device:  FWW





PT Plan


Treatment/Plan


Treatment Plan:  Continue Plan of Care


Treatment Plan:  Bed Mobility, Education, Functional Strength, Group Therapy, 

Gait, Safety, Therapeutic Exercise, Transfers


Treatment Duration:  Oct 31, 2018


Frequency:  11 times per week


Estimated Hrs Per Day:  .5 hour per day


Patient and/or Family Agrees t:  Yes





Time/GCodes


Time In:  1400


Time Out:  1420


Total Billed Treatment Time:  20


Total Billed Treatment


1 visit


FA - 20 mins











ROCHELLE ACOSTA PT Oct 26, 2018 14:26

## 2018-10-26 NOTE — ONCOLOGY PROGRESS NOTE
Subjective


Date Seen by a Provider:  Oct 26, 2018


Time Seen by a Provider:  16:39


Subjective/Events-last exam


c/o constipation. 


Pain is under good control with pain meds


Plan to be discharged to [facility] rehab Monday





Data Review


Labs


Laboratory Tests


10/26/18 05:45








Laboratory Tests


10/25/18 05:35: 


Red Blood Count 2.23L, Hemoglobin 6.9#*L, Hematocrit 21L, Red Cell Distribution 

Width 15.6H, Mean Platelet Volume 10.8H, Neutrophils (%) (Auto) 86H, 

Lymphocytes (%) (Auto) 11L, Lymphocytes # (Auto) 0.8L, Blood Urea Nitrogen 19H, 

Creatinine 0.59L, Glucose Level 129H, Aspartate Amino Transf (AST/SGOT) 73H, 

Alanine Aminotransferase (ALT/SGPT) 151H, Alkaline Phosphatase 286H, Total 

Protein 5.5L


10/26/18 05:45: 


Red Blood Count 2.12L, Hemoglobin 6.4*L, Hematocrit 20*L, Red Cell Distribution 

Width 17.5H, Mean Platelet Volume 10.8H, Blood Urea Nitrogen 20H, Creatinine 

0.50L, Calcium Level 8.2L


10/26/18 11:46: 








Physical Exam


Vital Signs





Vital Signs - First Documented








 10/24/18 10/26/18





 10:45 10:41


 


Temp 98.9 


 


Pulse 82 


 


Resp 14 


 


B/P (MAP) 150/63 (92) 


 


Pulse Ox 98 


 


O2 Delivery Room Air 


 


O2 Flow Rate  97.00





Capillary Refill : Less Than 3 Seconds


Height, Weight, BMI


Height: 5'2.00"


Weight: 113lbs. 7.0oz. [id number]; 20.6 BMI


Method:


General Appearance:  No Apparent Distress


HEENT:  PERRL/EOMI


Respiratory:  No Accessory Muscle Use, No Respiratory Distress


Cardiovascular:  Regular Rate, Rhythm


Extremity:  Swelling, Other (left leg)


Neurologic/Psychiatric:  Alert, Oriented x3





Impression & Plan


Impression & Plan


1.Displaced traumatic intertrochanteric/subtrochanteric fracture of left hip 

after a fall at home


s/p IM nailing of left hip fracture 10/24/2018. Doing well. Need transfusion 

today for Hb 6.8. 


2. Stage IV pancreatic CA mets to the liver, lungs and bones. s/p bile duct 

stent placement. She is on Gemzar adn Abraxan every other week, doing very 

well.  The treatment is palliative. 


3. Inclusion body myositis, symptoms improved after the chemo and steroid pre-

meds


4. Dysphagia due to #3 on feeding tube, doing well and gained 8lb.


5. Anemia, most likely from blood loss during surgery and IV dilution. Very 

unlikely from the chemo. 


6. h/o Right port pseudomonas infection, s/p removal. New port on the left. Pt 

finished total of 6 weeks of oral and IV antibiotics. No new issues at this 

point.  


7. Acute elevated LFTs, noticed 2 days ago but asymptomatic. ? malfunction of 

the stent. However the LFTs is better now. Will closely monitoring for now. 


8. Pt has routine f/u with me at cancer center when discharge.


9. Standard DVT prophylaxis for hip surgery per orthopedic service.  


10. OK to be discharged to rehab in [facility] Monday. 


11. Pt has 2 weeks f/u appointment with me at the cancer center.





Clinical Quality Measures


DVT/VTE Risk/Contraindication:


Risk Factor Score Per Nursing:  15


RFS Level Per Nursing on Admit:  4+=Very High











HERMAN HAM MD Oct 26, 2018 16:43

## 2018-10-27 VITALS — SYSTOLIC BLOOD PRESSURE: 129 MMHG | DIASTOLIC BLOOD PRESSURE: 60 MMHG

## 2018-10-27 VITALS — SYSTOLIC BLOOD PRESSURE: 124 MMHG | DIASTOLIC BLOOD PRESSURE: 54 MMHG

## 2018-10-27 VITALS — DIASTOLIC BLOOD PRESSURE: 56 MMHG | SYSTOLIC BLOOD PRESSURE: 119 MMHG

## 2018-10-27 VITALS — DIASTOLIC BLOOD PRESSURE: 61 MMHG | SYSTOLIC BLOOD PRESSURE: 126 MMHG

## 2018-10-27 LAB
BASOPHILS # BLD AUTO: 0 10^3/UL (ref 0–0.1)
BASOPHILS NFR BLD AUTO: 0 % (ref 0–10)
BUN/CREAT SERPL: 39
CALCIUM SERPL-MCNC: 8.4 MG/DL (ref 8.5–10.1)
CHLORIDE SERPL-SCNC: 102 MMOL/L (ref 98–107)
CO2 SERPL-SCNC: 26 MMOL/L (ref 21–32)
CREAT SERPL-MCNC: 0.46 MG/DL (ref 0.6–1.3)
EOSINOPHIL # BLD AUTO: 0.1 10^3/UL (ref 0–0.3)
EOSINOPHIL NFR BLD AUTO: 2 % (ref 0–10)
ERYTHROCYTE [DISTWIDTH] IN BLOOD BY AUTOMATED COUNT: 18.6 % (ref 10–14.5)
GFR SERPLBLD BASED ON 1.73 SQ M-ARVRAT: > 60 ML/MIN
GLUCOSE SERPL-MCNC: 95 MG/DL (ref 70–105)
HCT VFR BLD CALC: 24 % (ref 35–52)
HGB BLD-MCNC: 7.8 G/DL (ref 11.5–16)
LYMPHOCYTES # BLD AUTO: 1.2 X 10^3 (ref 1–4)
LYMPHOCYTES NFR BLD AUTO: 22 % (ref 12–44)
MANUAL DIFFERENTIAL PERFORMED BLD QL: NO
MCH RBC QN AUTO: 29 PG (ref 25–34)
MCHC RBC AUTO-ENTMCNC: 32 G/DL (ref 32–36)
MCV RBC AUTO: 90 FL (ref 80–99)
MONOCYTES # BLD AUTO: 0.1 X 10^3 (ref 0–1)
MONOCYTES NFR BLD AUTO: 1 % (ref 0–12)
NEUTROPHILS # BLD AUTO: 4.3 X 10^3 (ref 1.8–7.8)
NEUTROPHILS NFR BLD AUTO: 76 % (ref 42–75)
PLATELET # BLD: 178 10^3/UL (ref 130–400)
PMV BLD AUTO: 11 FL (ref 7.4–10.4)
POTASSIUM SERPL-SCNC: 4.5 MMOL/L (ref 3.6–5)
RBC # BLD AUTO: 2.69 10^6/UL (ref 4.35–5.85)
SODIUM SERPL-SCNC: 135 MMOL/L (ref 135–145)
WBC # BLD AUTO: 5.7 10^3/UL (ref 4.3–11)

## 2018-10-27 RX ADMIN — FENTANYL CITRATE PRN MCG: 50 INJECTION, SOLUTION INTRAMUSCULAR; INTRAVENOUS at 01:43

## 2018-10-27 RX ADMIN — Medication SCH ML: at 15:37

## 2018-10-27 RX ADMIN — Medication SCH ML: at 06:06

## 2018-10-27 RX ADMIN — FENTANYL CITRATE PRN MCG: 50 INJECTION, SOLUTION INTRAMUSCULAR; INTRAVENOUS at 18:18

## 2018-10-27 RX ADMIN — DOCUSATE SODIUM AND SENNOSIDES SCH EA: 8.6; 5 TABLET, FILM COATED ORAL at 20:22

## 2018-10-27 RX ADMIN — LORATADINE SCH MG: 10 TABLET ORAL at 08:56

## 2018-10-27 RX ADMIN — LEVOTHYROXINE SODIUM SCH MCG: 75 TABLET ORAL at 08:56

## 2018-10-27 RX ADMIN — DOCUSATE SODIUM AND SENNOSIDES SCH EA: 8.6; 5 TABLET, FILM COATED ORAL at 08:56

## 2018-10-27 RX ADMIN — FENTANYL CITRATE PRN MCG: 50 INJECTION, SOLUTION INTRAMUSCULAR; INTRAVENOUS at 06:06

## 2018-10-27 RX ADMIN — ASPIRIN SCH MG: 325 TABLET, COATED ORAL at 08:56

## 2018-10-27 RX ADMIN — HYDROCODONE BITARTRATE AND ACETAMINOPHEN PRN EA: 10; 325 TABLET ORAL at 09:11

## 2018-10-27 RX ADMIN — DOCUSATE SODIUM SCH MG: 100 CAPSULE ORAL at 08:56

## 2018-10-27 RX ADMIN — CARVEDILOL SCH ML: 25 TABLET, FILM COATED ORAL at 08:56

## 2018-10-27 RX ADMIN — Medication SCH ML: at 22:11

## 2018-10-27 RX ADMIN — ENOXAPARIN SODIUM SCH MG: 100 INJECTION SUBCUTANEOUS at 08:56

## 2018-10-27 RX ADMIN — HYDROCODONE BITARTRATE AND ACETAMINOPHEN PRN EA: 10; 325 TABLET ORAL at 22:11

## 2018-10-27 RX ADMIN — DICYCLOMINE HYDROCHLORIDE SCH EA: 20 TABLET ORAL at 13:43

## 2018-10-27 NOTE — PHYSICAL THERAPY DAILY NOTE
PT Daily Note-Current


Subjective


Patient agrees to PT.





Pain





   Numeric Pain Scale:  8


   Location:  Left


   Location Body Site:  Hip


   Pain Description:  Acute





Mental Status


Patient Orientation:  Normal For Age





Transfers


              Functional [place] Measure


0=Not Assessed/NA   4=Minimal Assistance


1=Total Assistance   5=Supervision or Setup


2=Maximal Assistance   6=Modified [place]


3=Moderate Assistance   7=Complete IndependenceIRFPAI Quality Coding Scale











6 Independent with activity with or without an assistive device


 


5  Patient requires set up or clean up by helper.  Patient completes activity  

by  themselves


 


4 Supervision or touching assist (CGA). [place] provide cues , steadying assist


 


3 The helper provides less than half the effort to complete the activity


 


2 The helper provides more than half the effort to complete the activity


 


1 Dependent.  The helper does all the effort to complete an activity 


 


7 Patient refused to complete or attempt activity


 


9 The patient did not perform the activity before the current illness or injury


 


88 Not attempted due to Medical conditions or safety concerns








Transfers (B, C, W/C) (FIM):  3


Scooting:  3


Rolling:  3


Supine to/from Sit:  3


Sit to/from Stand:  3


Bed to/from Chair:  3





Weight Bearing


Right Lower Extremity:  Right


Full Weight Bearing


Left Lower Extremity:  Left


Touch Toe Bearing





Exercises


Supine Ex:  Ankle pumps, Heel Slides


Supine Reps:  15 (AAROM)


Seated Therapy Exercises:  Ankle pumps, Long arc quads


Seated Reps:  15 (AAROM)





Assessment


Patient progressing slowly with treatment due to comorbidities.  Plan dismissal 

to [place] on Monday 10/29/18





PT Short Term Goals


Short Term Goals


Transfers (B,C,W/C) (FIM):  4





PT Long Term Goals


Long Term Goals


PT Long Term Goals Time Frame:  Oct 31, 2018


Transfers (B,C,W/C) (FIM):  4


Gait (FIM):  1


Gait distance (FIM):  1=up to 49 ft (20)


Distance:  20'


Gait Level of Assist:  4


Gait Assistive Device:  FWW





PT Plan


Treatment/Plan


Treatment Plan:  Continue Plan of Care


Treatment Plan:  Bed Mobility, Education, Functional Strength, Group Therapy, 

Gait, Safety, Therapeutic Exercise, Transfers


Treatment Duration:  Oct 31, 2018


Frequency:  11 times per week


Estimated Hrs Per Day:  .5 hour per day


Patient and/or Family Agrees t:  Yes





Time/GCodes


Time In:  715


Time Out:  740


Total Billed Treatment Time:  25


Total Billed Treatment


1 visit


FA 14 min


EX 11 min











ROCHELLE ACOSTA PT Oct 27, 2018 08:31

## 2018-10-27 NOTE — PROGRESS NOTE-HOSPITALIST
Subjective


HPI/CC On Admission


Date Seen by Provider:  Oct 27, 2018 (N)


Time Seen by Provider:  09:51


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 

presented to outside ER for evaluation after a fall. She states she attempted 

to walk without her walker this morning and stumbled over her feet and fell. 

She is currently living with her brother and sister in law and her sister in 

law found her shortly after the fall and called 911. She was brought to the ER 

at [facility] [place] and was found to have a left intertrochanteric hip fracture. She was 

transferred here for surgical evaluation. She denies any complaints at this 

time but thinks he pain is increasing as the medicine wears off. She notes a 

history of complication follow port placement when she received Versed at . 

She states she "crashed" and believes she was coded but does not know all of 

the details.


Subjective/Events-last exam


Pt reports doing well. No complaints. Pain controlled with current regimen.





Objective


Exam


Vital Signs





Vital Signs








  Date Time  Temp Pulse Resp B/P (MAP) Pulse Ox O2 Delivery O2 Flow Rate FiO2


 


10/27/18 08:00 98.1 85 18 126/61 (82) 97 Room Air  


 


10/26/18 10:41       97.00 





Capillary Refill : Less Than 3 Seconds


General Appearance:  No Apparent Distress, Chronically ill


Respiratory:  Lungs Clear, No Respiratory Distress


Cardiovascular:  Regular Rate, Rhythm, No Murmur


Gastrointestinal:  Normal Bowel Sounds, Non Tender, Soft





Results/Procedures


Lab


Laboratory Tests


10/27/18 05:50








Patient resulted labs reviewed.


Imaging:  Reviewed Imaging Report





Assessment/Plan


Assessment and Plan


Assess & Plan/Chief Complaint


Hip fracture





Diagnosis/Problems


Diagnosis/Problems





(1) Intertrochanteric fracture of left hip


Status:  Acute


Assessment & Plan:  Ortho consulted, appreciate recs


POD #3


PT/OT


IRU consulted and declined


Swing bed evaluation placed for possible swing bed at [facility]


Qualifiers:  


   Encounter type:  initial encounter  Fracture type:  closed  Fracture 

alignment:  displaced  Qualified Codes:  S72.142A - Displaced intertrochanteric 

fracture of left femur, initial encounter for closed fracture


(2) Normocytic anemia


Status:  Acute


Assessment & Plan:  Hgb 7.8 this AM


Likely multifactorial due to surgery and recent chemo


Trend





(3) Pancreatic cancer


Status:  Chronic


Assessment & Plan:  Follows with Dr Edwards


Received chemo 10/23


Oncology consulted, appreciate recs


Qualifiers:  


   Pancreatic malignancy location:  head of pancreas  Qualified Codes:  C25.0 - 

Malignant neoplasm of head of pancreas


(4) Inclusion body myositis


Assessment & Plan:  PT/OT 





(5) Transaminitis


Assessment & Plan:  Markedly improved


Trend








Clinical Quality Measures


DVT/VTE Risk/Contraindication:


Risk Factor Score Per Nursing:  15


RFS Level Per Nursing on Admit:  4+=Very High











RICKEY SOLANO MD Oct 27, 2018 9:52 am

## 2018-10-28 VITALS — SYSTOLIC BLOOD PRESSURE: 108 MMHG | DIASTOLIC BLOOD PRESSURE: 58 MMHG

## 2018-10-28 VITALS — SYSTOLIC BLOOD PRESSURE: 105 MMHG | DIASTOLIC BLOOD PRESSURE: 53 MMHG

## 2018-10-28 LAB
BASOPHILS # BLD AUTO: 0 10^3/UL (ref 0–0.1)
BASOPHILS NFR BLD AUTO: 0 % (ref 0–10)
BUN/CREAT SERPL: 32
CALCIUM SERPL-MCNC: 8.7 MG/DL (ref 8.5–10.1)
CHLORIDE SERPL-SCNC: 103 MMOL/L (ref 98–107)
CO2 SERPL-SCNC: 26 MMOL/L (ref 21–32)
CREAT SERPL-MCNC: 0.47 MG/DL (ref 0.6–1.3)
EOSINOPHIL # BLD AUTO: 0.1 10^3/UL (ref 0–0.3)
EOSINOPHIL NFR BLD AUTO: 2 % (ref 0–10)
ERYTHROCYTE [DISTWIDTH] IN BLOOD BY AUTOMATED COUNT: 17.8 % (ref 10–14.5)
GFR SERPLBLD BASED ON 1.73 SQ M-ARVRAT: > 60 ML/MIN
GLUCOSE SERPL-MCNC: 89 MG/DL (ref 70–105)
HCT VFR BLD CALC: 24 % (ref 35–52)
HGB BLD-MCNC: 7.6 G/DL (ref 11.5–16)
LYMPHOCYTES # BLD AUTO: 1.1 X 10^3 (ref 1–4)
LYMPHOCYTES NFR BLD AUTO: 28 % (ref 12–44)
MANUAL DIFFERENTIAL PERFORMED BLD QL: NO
MCH RBC QN AUTO: 29 PG (ref 25–34)
MCHC RBC AUTO-ENTMCNC: 32 G/DL (ref 32–36)
MCV RBC AUTO: 91 FL (ref 80–99)
MONOCYTES # BLD AUTO: 0.1 X 10^3 (ref 0–1)
MONOCYTES NFR BLD AUTO: 2 % (ref 0–12)
NEUTROPHILS # BLD AUTO: 2.7 X 10^3 (ref 1.8–7.8)
NEUTROPHILS NFR BLD AUTO: 67 % (ref 42–75)
PLATELET # BLD: 227 10^3/UL (ref 130–400)
PMV BLD AUTO: 10.3 FL (ref 7.4–10.4)
POTASSIUM SERPL-SCNC: 4.4 MMOL/L (ref 3.6–5)
RBC # BLD AUTO: 2.62 10^6/UL (ref 4.35–5.85)
SODIUM SERPL-SCNC: 138 MMOL/L (ref 135–145)
WBC # BLD AUTO: 4 10^3/UL (ref 4.3–11)

## 2018-10-28 RX ADMIN — DICYCLOMINE HYDROCHLORIDE SCH EA: 20 TABLET ORAL at 06:50

## 2018-10-28 RX ADMIN — DOCUSATE SODIUM AND SENNOSIDES SCH EA: 8.6; 5 TABLET, FILM COATED ORAL at 20:23

## 2018-10-28 RX ADMIN — CARVEDILOL SCH ML: 25 TABLET, FILM COATED ORAL at 09:22

## 2018-10-28 RX ADMIN — DOCUSATE SODIUM SCH MG: 100 CAPSULE ORAL at 09:21

## 2018-10-28 RX ADMIN — LEVOTHYROXINE SODIUM SCH MCG: 75 TABLET ORAL at 09:22

## 2018-10-28 RX ADMIN — DICYCLOMINE HYDROCHLORIDE SCH EA: 20 TABLET ORAL at 16:58

## 2018-10-28 RX ADMIN — Medication SCH ML: at 05:30

## 2018-10-28 RX ADMIN — Medication SCH ML: at 14:04

## 2018-10-28 RX ADMIN — ASPIRIN SCH MG: 325 TABLET, COATED ORAL at 09:21

## 2018-10-28 RX ADMIN — HYDROCODONE BITARTRATE AND ACETAMINOPHEN PRN EA: 10; 325 TABLET ORAL at 05:30

## 2018-10-28 RX ADMIN — HYDROCODONE BITARTRATE AND ACETAMINOPHEN PRN EA: 10; 325 TABLET ORAL at 16:58

## 2018-10-28 RX ADMIN — LORATADINE SCH MG: 10 TABLET ORAL at 09:21

## 2018-10-28 RX ADMIN — HYDROCODONE BITARTRATE AND ACETAMINOPHEN PRN EA: 10; 325 TABLET ORAL at 09:22

## 2018-10-28 RX ADMIN — Medication SCH ML: at 20:23

## 2018-10-28 RX ADMIN — DOCUSATE SODIUM AND SENNOSIDES SCH EA: 8.6; 5 TABLET, FILM COATED ORAL at 09:21

## 2018-10-28 RX ADMIN — DICYCLOMINE HYDROCHLORIDE SCH EA: 20 TABLET ORAL at 11:50

## 2018-10-29 VITALS — DIASTOLIC BLOOD PRESSURE: 55 MMHG | SYSTOLIC BLOOD PRESSURE: 114 MMHG

## 2018-10-29 VITALS — SYSTOLIC BLOOD PRESSURE: 122 MMHG | DIASTOLIC BLOOD PRESSURE: 60 MMHG

## 2018-10-29 LAB
BASOPHILS # BLD AUTO: 0 10^3/UL (ref 0–0.1)
BASOPHILS NFR BLD AUTO: 0 % (ref 0–10)
BUN/CREAT SERPL: 30
CALCIUM SERPL-MCNC: 9.2 MG/DL (ref 8.5–10.1)
CHLORIDE SERPL-SCNC: 102 MMOL/L (ref 98–107)
CO2 SERPL-SCNC: 25 MMOL/L (ref 21–32)
CREAT SERPL-MCNC: 0.5 MG/DL (ref 0.6–1.3)
EOSINOPHIL # BLD AUTO: 0 10^3/UL (ref 0–0.3)
EOSINOPHIL NFR BLD AUTO: 1 % (ref 0–10)
ERYTHROCYTE [DISTWIDTH] IN BLOOD BY AUTOMATED COUNT: 17.4 % (ref 10–14.5)
GFR SERPLBLD BASED ON 1.73 SQ M-ARVRAT: > 60 ML/MIN
GLUCOSE SERPL-MCNC: 92 MG/DL (ref 70–105)
HCT VFR BLD CALC: 25 % (ref 35–52)
HGB BLD-MCNC: 7.9 G/DL (ref 11.5–16)
LYMPHOCYTES # BLD AUTO: 1.2 X 10^3 (ref 1–4)
LYMPHOCYTES NFR BLD AUTO: 47 % (ref 12–44)
MANUAL DIFFERENTIAL PERFORMED BLD QL: NO
MCH RBC QN AUTO: 29 PG (ref 25–34)
MCHC RBC AUTO-ENTMCNC: 32 G/DL (ref 32–36)
MCV RBC AUTO: 91 FL (ref 80–99)
MONOCYTES # BLD AUTO: 0.3 X 10^3 (ref 0–1)
MONOCYTES NFR BLD AUTO: 11 % (ref 0–12)
NEUTROPHILS # BLD AUTO: 1 X 10^3 (ref 1.8–7.8)
NEUTROPHILS NFR BLD AUTO: 41 % (ref 42–75)
PLATELET # BLD: 246 10^3/UL (ref 130–400)
PMV BLD AUTO: 9.8 FL (ref 7.4–10.4)
POTASSIUM SERPL-SCNC: 4.4 MMOL/L (ref 3.6–5)
RBC # BLD AUTO: 2.73 10^6/UL (ref 4.35–5.85)
SODIUM SERPL-SCNC: 137 MMOL/L (ref 135–145)
WBC # BLD AUTO: 2.5 10^3/UL (ref 4.3–11)

## 2018-10-29 RX ADMIN — Medication SCH ML: at 06:02

## 2018-10-29 RX ADMIN — HYDROCODONE BITARTRATE AND ACETAMINOPHEN PRN EA: 10; 325 TABLET ORAL at 14:28

## 2018-10-29 RX ADMIN — CARVEDILOL SCH ML: 25 TABLET, FILM COATED ORAL at 08:32

## 2018-10-29 RX ADMIN — LORATADINE SCH MG: 10 TABLET ORAL at 08:32

## 2018-10-29 RX ADMIN — DOCUSATE SODIUM SCH MG: 100 CAPSULE ORAL at 08:34

## 2018-10-29 RX ADMIN — ASPIRIN SCH MG: 325 TABLET, COATED ORAL at 08:31

## 2018-10-29 RX ADMIN — Medication SCH ML: at 14:44

## 2018-10-29 RX ADMIN — DICYCLOMINE HYDROCHLORIDE SCH EA: 20 TABLET ORAL at 11:28

## 2018-10-29 RX ADMIN — DOCUSATE SODIUM AND SENNOSIDES SCH EA: 8.6; 5 TABLET, FILM COATED ORAL at 08:32

## 2018-10-29 RX ADMIN — HYDROCODONE BITARTRATE AND ACETAMINOPHEN PRN EA: 10; 325 TABLET ORAL at 06:02

## 2018-10-29 RX ADMIN — DICYCLOMINE HYDROCHLORIDE SCH EA: 20 TABLET ORAL at 14:28

## 2018-10-29 RX ADMIN — LEVOTHYROXINE SODIUM SCH MCG: 75 TABLET ORAL at 08:32

## 2018-10-29 RX ADMIN — DICYCLOMINE HYDROCHLORIDE SCH EA: 20 TABLET ORAL at 06:02

## 2018-10-29 NOTE — PHYSICAL THERAPY DAILY NOTE
PT Daily Note-Current


Subjective


Pt was awake in chair when PT arrived. Patient agreed to perform seated 

exercises for therapy.





Pain





   Numeric Pain Scale:  0-No Pain


   Location:  No Pain Reported





Mental Status


Patient Orientation:  Normal For Age


Attachments:  PEG Tube, IV





Transfers


              Functional [place] Measure


0=Not Assessed/NA   4=Minimal Assistance


1=Total Assistance   5=Supervision or Setup


2=Maximal Assistance   6=Modified [place]


3=Moderate Assistance   7=Complete IndependenceIRFPAI Quality Coding Scale











6 Independent with activity with or without an assistive device


 


5  Patient requires set up or clean up by helper.  Patient completes activity  

by  themselves


 


4 Supervision or touching assist (CGA). [place] provide cues , steadying assist


 


3 The helper provides less than half the effort to complete the activity


 


2 The helper provides more than half the effort to complete the activity


 


1 Dependent.  The helper does all the effort to complete an activity 


 


7 Patient refused to complete or attempt activity


 


9 The patient did not perform the activity before the current illness or injury


 


88 Not attempted due to Medical conditions or safety concerns











Weight Bearing


Right Lower Extremity:  Right


Full Weight Bearing


Left Lower Extremity:  Left


Touch Toe Bearing





Exercises


Supine Ex:  Ankle pumps, Quad Set, Heel Slides


Supine Reps:  15





Assessment


Pt was able to perform seated exercises without complaints of pain. Pt 

continues to use red theraband to assist herself during exercises. Pt will 

continue to benefit from exercises to improve overall function in L LE.





PT Short Term Goals


Short Term Goals


Time Frame:  Oct 29, 2018


Transfers (B,C,W/C) (FIM):  4





PT Long Term Goals


Long Term Goals


PT Long Term Goals Time Frame:  Oct 31, 2018


Transfers (B,C,W/C) (FIM):  4


Gait (FIM):  1


Gait distance (FIM):  1=up to 49 ft (20)


Distance:  20'


Gait Level of Assist:  4


Gait Assistive Device:  FWW





PT Plan


Treatment/Plan


Treatment Plan:  Continue Plan of Care


Treatment Plan:  Bed Mobility, Education, Functional Strength, Group Therapy, 

Gait, Safety, Therapeutic Exercise, Transfers


Treatment Duration:  Oct 31, 2018


Frequency:  11 times per week


Estimated Hrs Per Day:  .5 hour per day


Patient and/or Family Agrees t:  Yes





Time/GCodes


Time In:  1346


Time Out:  1356


Total Billed Treatment Time:  10


Total Billed Treatment


1 visit


Ex - 10 mins











ROCHELLE ACOSTA PT Oct 29, 2018 14:05

## 2018-10-29 NOTE — OCCUPATIONAL THER DAILY NOTE
OT Current Status-Daily Note


Subjective


Pt sitting in chair, states she is doing better today. Less pain, but does not 

rate.





Mental Status/Objective


              Functional [place] Measure


0=Not Assessed/NA   4=Minimal Assistance


1=Total Assistance   5=Supervision or Setup


2=Maximal Assistance   6=Modified [place]


3=Moderate Assistance   7=Complete [place]





ADL-Treatment


Pt sitting in recliner, declined to practice ADLs at this time. In depth 

discussion regarding ADL completion and use of adaptive equipment as needed. 

Education provided regarding role of therapy. Pt states she will be going to 

[facility] for a few weeks and then will be staying at her daughter's home which is 

completely accessible. Pt is knowledgeable about equipment and states she has 

all the adaptive equipment that will be needed and has no concerns at this time 

regarding ADL completion and safety. Pt states she is having less pain and 

feels she is beginning to move better. Pt has no questions at this time. Will 

continue per plan of care. All needs met.





OT Short Term Goals


Short Term Goals


Time Frame:  Nov 1, 2018


Grooming(FIM):  5


Bathing(FIM):  4


Upper Body Dressing(FIM):  4


Lower Body Dressing(FIM):  4


Toileting(FIM):  4


Transfers (B,C,W/C) (FIM):  4


Toilet/Commode Transfer(FIM):  4


Additional Short Term Goals:  1-Demonstrate ADL Tasks, 2-Verbalize Understanding

, 3-ImproveStrength/Andres


1=Demonstrate adherence to instructed precautions during ADL tasks.


2=Patient will verbalize/demonstrate understanding of assistive devices/

modifications for ADL.


3=Patient will improve strength/tolerance for activity to enable patient to 

perform ADL's.





OT Long Term Goals


Long Term Goals


Time Frame:  Nov 8, 2018


Grooming(FIM):  6


Bathing(FIM):  5


Upper Body Dressing(FIM):  6


Lower Body Dressing(FIM):  5


Toileting(FIM):  6


Transfers (B,C,W/C) (FIM):  6


Toilet/Commode Transfer(FIM):  6


Shower Transfer(FIM):  5


Additional Goals:  1-Demonstrate ADL Tasks, 2-Verbalize Understanding, 3-

ImproveStrength/Adnres


1=Demonstrate adherence to instructed precautions during ADL tasks.


2=Patient will verbalize/demonstrate understanding of assistive devices/

modifications for ADL.


3=Patient will improve strength/tolerance for activity to enable patient to 

perform ADL's.





OT Education/Plan


Discharge Recommendations


Plan/Recommendations:  Continue POC





Treatment Plan/Plan of Care


Patient would benefit from OT for education, treatment and training to promote 

independence in ADL's, mobility, safety and/or upper extremity function for ADL'

s.


Plan of Care:  ADL Retraining, Functional Mobility, UE Funct Exercise/Act


Treatment Duration:  Nov 8, 2018


Frequency:  5 times per week


Estimated Hrs Per Day:  .25 hour per day


Agreement:  Yes


Rehab Potential:  Fair





Time/GCodes


Start Time:  11:27


Stop Time:  11:40


Total Time Billed (hr/min):  13


Billed Treatment Time


1 visit, ADL(13minutes)











JCARLOS MONTOYA OT Oct 29, 2018 11:59

## 2018-10-29 NOTE — DISCHARGE INSTRUCTIONS
Discharge Instructions


Patient Instructions


Patient Instructions:  


Medications as per discharge sequence.


PT and OT as per included recommendations.





Activity & Diet


Discharge Diet:  Eat Small Frequent Meals


Activity as Tolerated:  Yes











VIRIDIANA JACOBO MD Oct 29, 2018 14:17

## 2018-10-29 NOTE — PHYSICAL THERAPY DAILY NOTE
PT Daily Note-Current


Subjective


Patient was awake in room and agreed to PT.





Pain





   Numeric Pain Scale:  0-No Pain


   Location:  No Pain Reported





Mental Status


Patient Orientation:  Normal For Age


Attachments:  PEG Tube, IV





Transfers


              Functional [place] Measure


0=Not Assessed/NA   4=Minimal Assistance


1=Total Assistance   5=Supervision or Setup


2=Maximal Assistance   6=Modified [place]


3=Moderate Assistance   7=Complete IndependenceIRFPAI Quality Coding Scale











6 Independent with activity with or without an assistive device


 


5  Patient requires set up or clean up by helper.  Patient completes activity  

by  themselves


 


4 Supervision or touching assist (CGA). [place] provide cues , steadying assist


 


3 The helper provides less than half the effort to complete the activity


 


2 The helper provides more than half the effort to complete the activity


 


1 Dependent.  The helper does all the effort to complete an activity 


 


7 Patient refused to complete or attempt activity


 


9 The patient did not perform the activity before the current illness or injury


 


88 Not attempted due to Medical conditions or safety concerns








Transfers (B, C, W/C) (FIM):  2


Scootin


Sit to/from Stand:  2





Weight Bearing


Right Lower Extremity:  Right


Full Weight Bearing


Left Lower Extremity:  Left


Touch Toe Bearing





Exercises


Seated Therapy Exercises:  Long arc quads, Hip flexion, Hamstring Curls


Seated Reps:  10





Assessment


Patient required max assist with transfer to [facility]. Pt stated she is starting 

to move better and is not experiencing as much pain. Patient was able to 

perform exercises but uses a red theraband to assist herself during specific 

movements. Patient will continue to benefit from therapy to maintain ROM and 

strength for functional mobility during transfers.





PT Short Term Goals


Short Term Goals


Transfers (B,C,W/C) (FIM):  4





PT Long Term Goals


Long Term Goals


PT Long Term Goals Time Frame:  Oct 31, 2018


Transfers (B,C,W/C) (FIM):  4


Gait (FIM):  1


Gait distance (FIM):  1=up to 49 ft (20)


Distance:  20'


Gait Level of Assist:  4


Gait Assistive Device:  FWW





PT Plan


Treatment/Plan


Treatment Plan:  Continue Plan of Care


Treatment Plan:  Bed Mobility, Education, Functional Strength, Group Therapy, 

Gait, Safety, Therapeutic Exercise, Transfers


Treatment Duration:  Oct 31, 2018


Frequency:  11 times per week


Estimated Hrs Per Day:  .5 hour per day


Patient and/or Family Agrees t:  Yes





Time/GCodes


Time In:  1030


Time Out:  1043


Total Billed Treatment Time:  13


Total Billed Treatment


1 visit


FA - 13 mins











ROCHELLE ACOSTA PT Oct 29, 2018 11:10

## 2018-10-29 NOTE — DISCHARGE SUMMARY-HOSPITALIST
Diagnosis/Chief Complaint


Date of Admission


Oct 24, 2018 at 10:44


Date of Discharge





Admission Diagnosis


left hip fracture


Discharge Diagnosis





(1) Intertrochanteric fracture of left hip


Status:  Acute


Assessment & Plan:  Ortho consulted, mikki recs


POD #4


PT/OT


IRU consulted and declined


Swing bed evaluation placed for possible swing bed at [facility]





(2) Normocytic anemia


Status:  Acute


Assessment & Plan:  Hgb 7.6 this AM


Likely multifactorial due to surgery and recent chemo


Trend





(3) Pancreatic cancer


Status:  Chronic


Assessment & Plan:  Metastatic


Follows with Dr Edwards


Received palliative chemo 10/23


Oncology consulted, appreciate recs





(4) Inclusion body myositis


Assessment & Plan:  PT/OT 





(5) Transaminitis


Assessment & Plan:  Markedly improved


Trend








Discharge Summary


Discharge Physical Exam


Allergies:  


Coded Allergies:  


     midazolam (Verified  Allergy, Severe, 10/24/18)


 


 CODED


     Sulfa (Sulfonamide Antibiotics) (Verified  Allergy, Unknown, 10/24/18)


Vitals & I&Os





Vital Signs








  Date Time  Temp Pulse Resp B/P (MAP) Pulse Ox O2 Delivery O2 Flow Rate FiO2


 


10/29/18 08:00      Room Air  


 


10/29/18 08:00 98.0 89 16 114/55 (74) 98   


 


10/26/18 10:41       97.00 








General Appearance:  No Apparent Distress, WD/WN





Hospital Course


The patient was walking with her walker on 10/24.  She fell and sustained a hip 

fracture, left intertrochanteric.  She was transferred to [facility] 

and went to the OR for repair by Dr. Miguel Angel Landeros on that date.  She is now 

ready for discharge and swing bed status with physical therapy.  It is 

anticipated that this may take a longer than usual time as she has a pancreatic 

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  It however 

seems reasonable to try and returned her to ambulatory state.  I spoke to her 

physician Dr. Miguelina Daley and she has been accepted in transfer.


Labs (last 24 hrs)


Laboratory Tests


10/29/18 06:10: 


White Blood Count 2.5L, Red Blood Count 2.73L, Hemoglobin 7.9L, Hematocrit 25L, 

Mean Corpuscular Volume 91, Mean Corpuscular Hemoglobin 29, Mean Corpuscular 

Hemoglobin Concent 32, Red Cell Distribution Width 17.4H, Platelet Count 246, 

Mean Platelet Volume 9.8, Neutrophils (%) (Auto) 41L, Lymphocytes (%) (Auto) 47H

, Monocytes (%) (Auto) 11, Eosinophils (%) (Auto) 1, Basophils (%) (Auto) 0, 

Neutrophils # (Auto) 1.0L, Lymphocytes # (Auto) 1.2, Monocytes # (Auto) 0.3, 

Eosinophils # (Auto) 0.0, Basophils # (Auto) 0.0, Sodium Level 137, Potassium 

Level 4.4, Chloride Level 102, Carbon Dioxide Level 25, Anion Gap 10, Blood 

Urea Nitrogen 15, Creatinine 0.50L, Estimat Glomerular Filtration Rate > 60, BUN

/Creatinine Ratio 30, Glucose Level 92, Calcium Level 9.2


10/29/18 12:54: Lab Scanned Report Transfusion Reaction Form





Microbiology


10/24/18 MRSA Screen - Final, Complete


           MRSA not isolated


Patient resulted labs reviewed.


Pending Labs


Laboratory Tests


10/29/18 06:10: 


White Blood Count 2.5, Red Blood Count 2.73, Hemoglobin 7.9, Hematocrit 25, 

Mean Corpuscular Volume 91, Mean Corpuscular Hemoglobin 29, Mean Corpuscular 

Hemoglobin Concent 32, Red Cell Distribution Width 17.4, Platelet Count 246, 

Mean Platelet Volume 9.8, Neutrophils (%) (Auto) 41, Lymphocytes (%) (Auto) 47, 

Monocytes (%) (Auto) 11, Eosinophils (%) (Auto) 1, Basophils (%) (Auto) 0, 

Neutrophils # (Auto) 1.0, Lymphocytes # (Auto) 1.2, Monocytes # (Auto) 0.3, 

Eosinophils # (Auto) 0.0, Basophils # (Auto) 0.0, Sodium Level 137, Potassium 

Level 4.4, Chloride Level 102, Carbon Dioxide Level 25, Anion Gap 10, Blood 

Urea Nitrogen 15, Creatinine 0.50, Estimat Glomerular Filtration Rate > 60, BUN/

Creatinine Ratio 30, Glucose Level 92, Calcium Level 9.2


10/29/18 12:54: Lab Scanned Report Transfusion Reaction Form





Imaging:  Reviewed Imaging Report





Discussion & Recommendations


Discharge Planning:  >30 minutes discharge planning





Discharge


Home Medications:





Active Scripts


Active


Reported


Centrum Multivit-Mineral Liq (Multivits W-Min/Ferrous Gluc) 9 Mg/15 Ml Liquid 

15 Ml GT DAILY


Ventolin Hfa (Albuterol Sulfate) 18 Gm Hfa.aer.ad 1-2 Puff INH QID PRN


Tramadol HCl 50 Mg Tablet 50 Mg PO BID PRN


Creon  36,000 Units Capsule (Lipase/Protease/Amylase) 1 Each Capsule. 1 Cap 

PO TIDAC


Ondansetron HCl 8 Mg Tablet 8 Mg PO BID PRN


Lorazepam 0.5 Mg Tablet 0.5 Mg PO HS PRN


Loratadine 10 Mg Tablet 10 Mg PO DAILY


Benadryl (Diphenhydramine HCl) 25 Mg Capsule 25 Mg PO HS


Levothyroxine Sodium 75 Mcg Tablet 75 Mcg PO DAILY





Instructions to patient/family


Please see electronic discharge instructions given to patient.





Clinical Quality Measures


DVT/VTE Risk/Contraindication:


Risk Factor Score Per Nursing:  15


RFS Level Per Nursing on Admit:  4+=Very High





Problem Qualifiers





(1) Intertrochanteric fracture of left hip:  


Encounter type:  initial encounter  Fracture type:  closed  Fracture alignment:

  displaced  Qualified Codes:  S72.142A - Displaced intertrochanteric fracture 

of left femur, initial encounter for closed fracture


(2) Pancreatic cancer:  


Pancreatic malignancy location:  head of pancreas  Qualified Codes:  C25.0 - 

Malignant neoplasm of head of pancreas








VIRIDIANA JACOBO MD Oct 29, 2018 14:11

## 2018-11-14 LAB
ALBUMIN SERPL-MCNC: 3.6 GM/DL (ref 3.2–4.5)
ALP SERPL-CCNC: 1040 U/L (ref 40–136)
ALT SERPL-CCNC: 118 U/L (ref 0–55)
BASOPHILS # BLD AUTO: 0.1 10^3/UL (ref 0–0.1)
BASOPHILS NFR BLD AUTO: 1 % (ref 0–10)
BILIRUB SERPL-MCNC: 5.6 MG/DL (ref 0.1–1)
BUN/CREAT SERPL: 32
CALCIUM SERPL-MCNC: 9.9 MG/DL (ref 8.5–10.1)
CHLORIDE SERPL-SCNC: 101 MMOL/L (ref 98–107)
CO2 SERPL-SCNC: 24 MMOL/L (ref 21–32)
CREAT SERPL-MCNC: 0.59 MG/DL (ref 0.6–1.3)
EOSINOPHIL # BLD AUTO: 0.5 10^3/UL (ref 0–0.3)
EOSINOPHIL NFR BLD AUTO: 6 % (ref 0–10)
ERYTHROCYTE [DISTWIDTH] IN BLOOD BY AUTOMATED COUNT: 19.9 % (ref 10–14.5)
GFR SERPLBLD BASED ON 1.73 SQ M-ARVRAT: > 60 ML/MIN
GLUCOSE SERPL-MCNC: 132 MG/DL (ref 70–105)
HCT VFR BLD CALC: 34 % (ref 35–52)
HGB BLD-MCNC: 10.8 G/DL (ref 11.5–16)
LYMPHOCYTES # BLD AUTO: 1.4 X 10^3 (ref 1–4)
LYMPHOCYTES NFR BLD AUTO: 16 % (ref 12–44)
MANUAL DIFFERENTIAL PERFORMED BLD QL: NO
MCH RBC QN AUTO: 29 PG (ref 25–34)
MCHC RBC AUTO-ENTMCNC: 32 G/DL (ref 32–36)
MCV RBC AUTO: 93 FL (ref 80–99)
MONOCYTES # BLD AUTO: 1.1 X 10^3 (ref 0–1)
MONOCYTES NFR BLD AUTO: 13 % (ref 0–12)
NEUTROPHILS # BLD AUTO: 5.5 X 10^3 (ref 1.8–7.8)
NEUTROPHILS NFR BLD AUTO: 65 % (ref 42–75)
PLATELET # BLD: 585 10^3/UL (ref 130–400)
PMV BLD AUTO: 10.2 FL (ref 7.4–10.4)
POTASSIUM SERPL-SCNC: 4.4 MMOL/L (ref 3.6–5)
PROT SERPL-MCNC: 7.4 GM/DL (ref 6.4–8.2)
RBC # BLD AUTO: 3.68 10^6/UL (ref 4.35–5.85)
SODIUM SERPL-SCNC: 135 MMOL/L (ref 135–145)
WBC # BLD AUTO: 8.5 10^3/UL (ref 4.3–11)

## 2018-11-15 ENCOUNTER — HOSPITAL ENCOUNTER (OUTPATIENT)
Dept: HOSPITAL 75 - RAD | Age: 78
End: 2018-11-15
Attending: INTERNAL MEDICINE
Payer: MEDICARE

## 2018-11-15 DIAGNOSIS — R91.8: ICD-10-CM

## 2018-11-15 DIAGNOSIS — C25.9: Primary | ICD-10-CM

## 2018-11-15 DIAGNOSIS — R17: ICD-10-CM

## 2018-11-15 DIAGNOSIS — K83.8: ICD-10-CM

## 2018-11-15 DIAGNOSIS — Z96.89: ICD-10-CM

## 2018-11-15 PROCEDURE — 71260 CT THORAX DX C+: CPT

## 2018-11-15 PROCEDURE — 74177 CT ABD & PELVIS W/CONTRAST: CPT

## 2018-11-15 NOTE — DIAGNOSTIC IMAGING REPORT
PROCEDURE: CT chest, abdomen, and pelvis with contrast.



TECHNIQUE: Multiple contiguous axial images were obtained through

the chest, abdomen, and pelvis after the administration of

intravenous contrast.



INDICATION:  Jaundice.



COMPARISON: No prior studies are available for comparison.



CT CHEST:



A left chest wall port has the tip at the SVC right atrial

junction. No axillary lymphadenopathy is identified. Mildly

prominent AP window node measures 1.2 x 1.0 cm. No hilar

lymphadenopathy is detected. No pericardial or pleural fluid is

identified. Parenchymal evaluation does show numerous pulmonary

nodules throughout both lungs suggestive of pulmonary metastatic

disease. Spiculated nodule in the right lower lobe measures 10

mm. Innumerable subcentimeter nodules are seen.



CT OF THE ABDOMEN AND PELVIS:



There is marked intrahepatic and extrahepatic biliary ductal

dilatation. Biliary stent is in place. There is some moderate

distention to the gallbladder. Marked pancreatic ductal

dilatation is seen. There is atrophy of the pancreatic body and

tail. No discrete pancreatic mass is seen. G-tube within the

stomach is noted. Spleen is unremarkable. No adrenal mass is

seen. The kidneys are unremarkable. Aorta and iliac vessels are

heavily calcified but non-aneurysmal. Small and large bowel loops

are normal in caliber. No obstruction is seen. There is no

ascites. The bladder is unremarkable. Postsurgical changes to the

left hip are noted.



IMPRESSION:



1. Innumerable pulmonary nodules suggestive of pulmonary

metastatic disease.



2. Common duct stent with marked intrahepatic and extra-hepatic

biliary ductal dilatation. No discrete liver mass or pancreatic

mass is seen. No significant abdominal or pelvic lymphadenopathy

is detected.



Dictated by: 



  Dictated on workstation # [id number]

## 2018-11-26 LAB
ALBUMIN SERPL-MCNC: 3.6 GM/DL (ref 3.2–4.5)
ALP SERPL-CCNC: 529 U/L (ref 40–136)
ALT SERPL-CCNC: 86 U/L (ref 0–55)
BASOPHILS # BLD AUTO: 0 10^3/UL (ref 0–0.1)
BASOPHILS NFR BLD AUTO: 0 % (ref 0–10)
BILIRUB SERPL-MCNC: 1.6 MG/DL (ref 0.1–1)
BUN/CREAT SERPL: 50
CALCIUM SERPL-MCNC: 9.7 MG/DL (ref 8.5–10.1)
CHLORIDE SERPL-SCNC: 103 MMOL/L (ref 98–107)
CO2 SERPL-SCNC: 22 MMOL/L (ref 21–32)
CREAT SERPL-MCNC: 0.6 MG/DL (ref 0.6–1.3)
EOSINOPHIL # BLD AUTO: 0.4 10^3/UL (ref 0–0.3)
EOSINOPHIL NFR BLD AUTO: 6 % (ref 0–10)
ERYTHROCYTE [DISTWIDTH] IN BLOOD BY AUTOMATED COUNT: 19 % (ref 10–14.5)
GFR SERPLBLD BASED ON 1.73 SQ M-ARVRAT: > 60 ML/MIN
GLUCOSE SERPL-MCNC: 144 MG/DL (ref 70–105)
HCT VFR BLD CALC: 35 % (ref 35–52)
HGB BLD-MCNC: 11.3 G/DL (ref 11.5–16)
LYMPHOCYTES # BLD AUTO: 1.8 X 10^3 (ref 1–4)
LYMPHOCYTES NFR BLD AUTO: 25 % (ref 12–44)
MANUAL DIFFERENTIAL PERFORMED BLD QL: NO
MCH RBC QN AUTO: 30 PG (ref 25–34)
MCHC RBC AUTO-ENTMCNC: 32 G/DL (ref 32–36)
MCV RBC AUTO: 94 FL (ref 80–99)
MONOCYTES # BLD AUTO: 0.8 X 10^3 (ref 0–1)
MONOCYTES NFR BLD AUTO: 11 % (ref 0–12)
NEUTROPHILS # BLD AUTO: 4.1 X 10^3 (ref 1.8–7.8)
NEUTROPHILS NFR BLD AUTO: 58 % (ref 42–75)
PLATELET # BLD: 261 10^3/UL (ref 130–400)
PMV BLD AUTO: 10.7 FL (ref 7.4–10.4)
POTASSIUM SERPL-SCNC: 4.6 MMOL/L (ref 3.6–5)
PROT SERPL-MCNC: 7.4 GM/DL (ref 6.4–8.2)
RBC # BLD AUTO: 3.74 10^6/UL (ref 4.35–5.85)
SODIUM SERPL-SCNC: 136 MMOL/L (ref 135–145)
WBC # BLD AUTO: 7 10^3/UL (ref 4.3–11)

## 2018-12-11 NOTE — PROGRESS NOTE-HOSPITALIST
Discussed condition and exacerbating conditions/situations (e.g., dry/arid environments, overhead fans, air conditioners, side effect of medications). Subjective


HPI/CC On Admission


Date Seen by Provider:  Oct 28, 2018


Time Seen by Provider:  10:11


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 

presented to outside ER for evaluation after a fall. She states she attempted 

to walk without her walker this morning and stumbled over her feet and fell. 

She is currently living with her brother and sister in law and her sister in 

law found her shortly after the fall and called 911. She was brought to [place] 

[place] [facility] and was found to have a left intertrochanteric hip fracture. She was 

transferred here for surgical evaluation. She denies any complaints at this 

time but thinks he pain is increasing as the medicine wears off. She notes a 

history of complication follow port placement when she received Versed at . 

She states she "crashed" and believes she was coded but does not know all of 

the details.


Subjective/Events-last exam


Pt reports doing well. No pain with current pain regimen.





Objective


Exam


Vital Signs





Vital Signs








  Date Time  Temp Pulse Resp B/P (MAP) Pulse Ox O2 Delivery O2 Flow Rate FiO2


 


10/28/18 08:00 97.3 81 18 105/53 (70) 99 Room Air  


 


10/26/18 10:41       97.00 





Capillary Refill : Less Than 3 Seconds


General Appearance:  No Apparent Distress, Chronically ill, Thin


Respiratory:  Lungs Clear, No Respiratory Distress


Cardiovascular:  Regular Rate, Rhythm, No Murmur


Neurologic/Psychiatric:  Alert, Oriented x3, Normal Mood/Affect





Results/Procedures


Lab


Laboratory Tests


10/28/18 06:04








Patient resulted labs reviewed.


Imaging:  Reviewed Imaging Report





Assessment/Plan


Assessment and Plan


Assess & Plan/Chief Complaint


Hip fracture





Diagnosis/Problems


Diagnosis/Problems





(1) Intertrochanteric fracture of left hip


Status:  Acute


Assessment & Plan:  Ortho consulted, appreciate recs


POD #4


PT/OT


IRU consulted and declined


Swing bed evaluation placed for possible swing bed at [facility]


Qualifiers:  


   Encounter type:  initial encounter  Fracture type:  closed  Fracture 

alignment:  displaced  Qualified Codes:  S72.142A - Displaced intertrochanteric 

fracture of left femur, initial encounter for closed fracture


(2) Normocytic anemia


Status:  Acute


Assessment & Plan:  Hgb 7.6 this AM


Likely multifactorial due to surgery and recent chemo


Trend





(3) Pancreatic cancer


Status:  Chronic


Assessment & Plan:  Metastatic


Follows with Dr Edwards


Received palliative chemo 10/23


Oncology consulted, appreciate recs


Qualifiers:  


   Pancreatic malignancy location:  head of pancreas  Qualified Codes:  C25.0 - 

Malignant neoplasm of head of pancreas


(4) Inclusion body myositis


Assessment & Plan:  PT/OT 





(5) Transaminitis


Assessment & Plan:  Markedly improved


Trend








Clinical Quality Measures


DVT/VTE Risk/Contraindication:


Risk Factor Score Per Nursing:  15


RFS Level Per Nursing on Admit:  4+=Very High











RICKEY SOLANO MD Oct 28, 2018 10:14 am

## 2018-12-17 ENCOUNTER — HOSPITAL ENCOUNTER (OUTPATIENT)
Dept: HOSPITAL 75 - ONC | Age: 78
LOS: 1 days | Discharge: HOME | End: 2018-12-18
Attending: INTERNAL MEDICINE
Payer: MEDICARE

## 2018-12-17 VITALS — HEIGHT: 61 IN | BODY MASS INDEX: 20.2 KG/M2 | WEIGHT: 107 LBS

## 2018-12-17 DIAGNOSIS — C78.7: ICD-10-CM

## 2018-12-17 DIAGNOSIS — Z79.899: ICD-10-CM

## 2018-12-17 DIAGNOSIS — C25.0: ICD-10-CM

## 2018-12-17 DIAGNOSIS — Z93.1: ICD-10-CM

## 2018-12-17 DIAGNOSIS — Z51.11: Primary | ICD-10-CM

## 2018-12-17 DIAGNOSIS — C79.51: ICD-10-CM

## 2018-12-17 DIAGNOSIS — C78.01: ICD-10-CM

## 2018-12-17 DIAGNOSIS — C78.02: ICD-10-CM

## 2018-12-17 LAB
ALBUMIN SERPL-MCNC: 3.8 GM/DL (ref 3.2–4.5)
ALP SERPL-CCNC: 377 U/L (ref 40–136)
ALT SERPL-CCNC: 94 U/L (ref 0–55)
BASOPHILS # BLD AUTO: 0 10^3/UL (ref 0–0.1)
BASOPHILS NFR BLD AUTO: 0 % (ref 0–10)
BILIRUB SERPL-MCNC: 0.8 MG/DL (ref 0.1–1)
BUN/CREAT SERPL: 38
CALCIUM SERPL-MCNC: 9.8 MG/DL (ref 8.5–10.1)
CHLORIDE SERPL-SCNC: 101 MMOL/L (ref 98–107)
CO2 SERPL-SCNC: 26 MMOL/L (ref 21–32)
CREAT SERPL-MCNC: 0.6 MG/DL (ref 0.6–1.3)
EOSINOPHIL # BLD AUTO: 0.3 10^3/UL (ref 0–0.3)
EOSINOPHIL NFR BLD AUTO: 4 % (ref 0–10)
ERYTHROCYTE [DISTWIDTH] IN BLOOD BY AUTOMATED COUNT: 16 % (ref 10–14.5)
GFR SERPLBLD BASED ON 1.73 SQ M-ARVRAT: > 60 ML/MIN
GLUCOSE SERPL-MCNC: 118 MG/DL (ref 70–105)
HCT VFR BLD CALC: 38 % (ref 35–52)
HGB BLD-MCNC: 12.4 G/DL (ref 11.5–16)
LYMPHOCYTES # BLD AUTO: 1.6 X 10^3 (ref 1–4)
LYMPHOCYTES NFR BLD AUTO: 26 % (ref 12–44)
MANUAL DIFFERENTIAL PERFORMED BLD QL: NO
MCH RBC QN AUTO: 30 PG (ref 25–34)
MCHC RBC AUTO-ENTMCNC: 32 G/DL (ref 32–36)
MCV RBC AUTO: 93 FL (ref 80–99)
MONOCYTES # BLD AUTO: 0.6 X 10^3 (ref 0–1)
MONOCYTES NFR BLD AUTO: 10 % (ref 0–12)
NEUTROPHILS # BLD AUTO: 3.6 X 10^3 (ref 1.8–7.8)
NEUTROPHILS NFR BLD AUTO: 59 % (ref 42–75)
PLATELET # BLD: 211 10^3/UL (ref 130–400)
PMV BLD AUTO: 10.9 FL (ref 7.4–10.4)
POTASSIUM SERPL-SCNC: 4.2 MMOL/L (ref 3.6–5)
PROT SERPL-MCNC: 7.4 GM/DL (ref 6.4–8.2)
RBC # BLD AUTO: 4.1 10^6/UL (ref 4.35–5.85)
SODIUM SERPL-SCNC: 138 MMOL/L (ref 135–145)
WBC # BLD AUTO: 6.1 10^3/UL (ref 4.3–11)

## 2018-12-17 PROCEDURE — 96413 CHEMO IV INFUSION 1 HR: CPT

## 2018-12-17 PROCEDURE — 85025 COMPLETE CBC W/AUTO DIFF WBC: CPT

## 2018-12-17 PROCEDURE — 86301 IMMUNOASSAY TUMOR CA 19-9: CPT

## 2018-12-17 PROCEDURE — 36591 DRAW BLOOD OFF VENOUS DEVICE: CPT

## 2018-12-17 PROCEDURE — 80053 COMPREHEN METABOLIC PANEL: CPT

## 2018-12-17 PROCEDURE — 96375 TX/PRO/DX INJ NEW DRUG ADDON: CPT

## 2018-12-17 PROCEDURE — 96417 CHEMO IV INFUS EACH ADDL SEQ: CPT

## 2018-12-17 PROCEDURE — 80048 BASIC METABOLIC PNL TOTAL CA: CPT

## 2018-12-17 PROCEDURE — 90471 IMMUNIZATION ADMIN: CPT

## 2018-12-17 PROCEDURE — 90686 IIV4 VACC NO PRSV 0.5 ML IM: CPT

## 2018-12-21 ENCOUNTER — HOSPITAL ENCOUNTER (OUTPATIENT)
Dept: HOSPITAL 75 - ONC | Age: 78
LOS: 17 days | Discharge: HOME | End: 2019-01-07
Attending: INTERNAL MEDICINE
Payer: MEDICARE

## 2018-12-21 DIAGNOSIS — C79.51: ICD-10-CM

## 2018-12-21 DIAGNOSIS — C78.7: ICD-10-CM

## 2018-12-21 DIAGNOSIS — Z93.1: ICD-10-CM

## 2018-12-21 DIAGNOSIS — C78.02: ICD-10-CM

## 2018-12-21 DIAGNOSIS — Z79.899: ICD-10-CM

## 2018-12-21 DIAGNOSIS — C78.01: ICD-10-CM

## 2018-12-21 DIAGNOSIS — C25.0: Primary | ICD-10-CM

## 2018-12-21 PROCEDURE — 99213 OFFICE O/P EST LOW 20 MIN: CPT

## 2019-01-02 ENCOUNTER — HOSPITAL ENCOUNTER (OUTPATIENT)
Dept: HOSPITAL 75 - RAD | Age: 79
End: 2019-01-02
Attending: INTERNAL MEDICINE
Payer: MEDICARE

## 2019-01-02 DIAGNOSIS — Z96.89: ICD-10-CM

## 2019-01-02 DIAGNOSIS — R91.8: ICD-10-CM

## 2019-01-02 DIAGNOSIS — K83.8: ICD-10-CM

## 2019-01-02 DIAGNOSIS — C25.9: Primary | ICD-10-CM

## 2019-01-02 PROCEDURE — 71260 CT THORAX DX C+: CPT

## 2019-01-02 PROCEDURE — 74160 CT ABDOMEN W/CONTRAST: CPT

## 2019-01-02 NOTE — DIAGNOSTIC IMAGING REPORT
PROCEDURE: CT chest and abdomen with contrast.



TECHNIQUE: Multiple contiguous axial images were obtained through

the chest and abdomen after the administration of intravenous

contrast.



INDICATION: Pancreatic carcinoma.



FINDINGS: The previous CT chest and abdomen exam of 11/15/2018

noted multiple pulmonary nodules. These were suggestive of

metastatic disease related to the patient's diagnosis of

pancreatic carcinoma. On this exam, the nodules are again

identified. On the prior exam, the largest nodules were in the

right lung base. These measured approximately 11.5 and 10.4 mm in

maximum dimension. On this exam, those nodules now measure 12.6

and 13.0 mm respectively. There also appears to have been a

generalized slight increase in the other nodules throughout both

lungs. This appearance does suggest progressive metastatic

disease. The prior study also noted an 11.5 x 10.0 mm pretracheal

node on the left. That node does not appear to have changed

significantly. It now measures 12.1 x 8.8 mm. No other

mediastinal or hilar adenopathy has developed.



There is no sign of an acute cardiopulmonary abnormality. The

heart size is within normal limits. The aorta is not abnormally

dilated, and there is no sign of a dissection. There is no defect

within the pulmonary arteries to indicate a pulmonary embolus.



The thyroid gland where visualized is unremarkable.



There are emphysematous changes involving both lungs. There is no

sign of failure, pneumonia, or a pleural effusion to indicate an

acute abnormality.



On the prior exam, there was marked dilatation of the common bile

duct and proximal biliary tree. There also appeared to be a stent

within the common bile duct. In the interval since the previous

exam, it appears that a new stent has been inserted. The biliary

tree and common bile duct are much less distended than noted on

the prior exam. However, there is now pneumobilia.



The main pancreatic duct is dilated and measures approximately

10.9 mm in maximum diameter (normal 2 mm). There were areas of

diminished density in the region of the pancreas and uncinate

head on the prior exam. These areas have coalesced and now form

an 11.8 x 23.8 mm area of diminished density. This finding is

suspicious for neoplasm.



The liver seems homogeneous. The spleen, pancreas, adrenals,

kidneys, aorta, and inferior vena cava show no sign of an acute

abnormality. In the interval since the prior exam, a PEG tube has

been inserted into the stomach. The PEG tube seems to be in good

position. There does appear to be generalized thickening of the

wall of the distal esophagus. This finding was also present on

the prior exam.



The bone windows are unremarkable for a fracture or for a

destructive lesion.



IMPRESSION:

1. The pulmonary nodules in the lungs seen on the prior study do

measure slightly larger than on the prior exam. This appearance

does suggest progressive neoplastic disease.

2. In the interval since the prior study, the biliary stents have

been exchanged. There is much less distention of the common bile

duct and proximal biliary tree than noted on the prior exam,

although there is now pneumobilia.

3. The area of diminished density in the head of the pancreas is

suspicious for a pancreatic neoplastic process. If further

imaging is desired, then MRI would be recommended.

4. There is no acute abnormality of the chest or abdomen

identified.



Dictated by: 



  Dictated on workstation # [id number]

## 2019-01-07 LAB
ALBUMIN SERPL-MCNC: 3.9 GM/DL (ref 3.2–4.5)
ALP SERPL-CCNC: 266 U/L (ref 40–136)
ALT SERPL-CCNC: 46 U/L (ref 0–55)
BASOPHILS # BLD AUTO: 0 10^3/UL (ref 0–0.1)
BASOPHILS NFR BLD AUTO: 0 % (ref 0–10)
BILIRUB SERPL-MCNC: 0.5 MG/DL (ref 0.1–1)
BUN/CREAT SERPL: 47
CALCIUM SERPL-MCNC: 9.6 MG/DL (ref 8.5–10.1)
CHLORIDE SERPL-SCNC: 99 MMOL/L (ref 98–107)
CO2 SERPL-SCNC: 27 MMOL/L (ref 21–32)
CREAT SERPL-MCNC: 0.6 MG/DL (ref 0.6–1.3)
EOSINOPHIL # BLD AUTO: 0.3 10^3/UL (ref 0–0.3)
EOSINOPHIL NFR BLD AUTO: 4 % (ref 0–10)
ERYTHROCYTE [DISTWIDTH] IN BLOOD BY AUTOMATED COUNT: 15.1 % (ref 10–14.5)
GFR SERPLBLD BASED ON 1.73 SQ M-ARVRAT: > 60 ML/MIN
GLUCOSE SERPL-MCNC: 144 MG/DL (ref 70–105)
HCT VFR BLD CALC: 37 % (ref 35–52)
HGB BLD-MCNC: 12.1 G/DL (ref 11.5–16)
LYMPHOCYTES # BLD AUTO: 1.5 X 10^3 (ref 1–4)
LYMPHOCYTES NFR BLD AUTO: 26 % (ref 12–44)
MANUAL DIFFERENTIAL PERFORMED BLD QL: NO
MCH RBC QN AUTO: 31 PG (ref 25–34)
MCHC RBC AUTO-ENTMCNC: 33 G/DL (ref 32–36)
MCV RBC AUTO: 94 FL (ref 80–99)
MONOCYTES # BLD AUTO: 0.6 X 10^3 (ref 0–1)
MONOCYTES NFR BLD AUTO: 10 % (ref 0–12)
NEUTROPHILS # BLD AUTO: 3.4 X 10^3 (ref 1.8–7.8)
NEUTROPHILS NFR BLD AUTO: 59 % (ref 42–75)
PLATELET # BLD: 206 10^3/UL (ref 130–400)
PMV BLD AUTO: 10.8 FL (ref 7.4–10.4)
POTASSIUM SERPL-SCNC: 4.4 MMOL/L (ref 3.6–5)
PROT SERPL-MCNC: 7.7 GM/DL (ref 6.4–8.2)
SODIUM SERPL-SCNC: 135 MMOL/L (ref 135–145)
WBC # BLD AUTO: 5.8 10^3/UL (ref 4.3–11)

## 2019-01-21 LAB
BASOPHILS # BLD AUTO: 0 10^3/UL (ref 0–0.1)
BASOPHILS NFR BLD AUTO: 0 % (ref 0–10)
BUN/CREAT SERPL: 41
CALCIUM SERPL-MCNC: 9.6 MG/DL (ref 8.5–10.1)
CHLORIDE SERPL-SCNC: 103 MMOL/L (ref 98–107)
CO2 SERPL-SCNC: 25 MMOL/L (ref 21–32)
CREAT SERPL-MCNC: 0.58 MG/DL (ref 0.6–1.3)
EOSINOPHIL # BLD AUTO: 0.2 10^3/UL (ref 0–0.3)
EOSINOPHIL NFR BLD AUTO: 4 % (ref 0–10)
ERYTHROCYTE [DISTWIDTH] IN BLOOD BY AUTOMATED COUNT: 15 % (ref 10–14.5)
GFR SERPLBLD BASED ON 1.73 SQ M-ARVRAT: > 60 ML/MIN
GLUCOSE SERPL-MCNC: 122 MG/DL (ref 70–105)
HCT VFR BLD CALC: 35 % (ref 35–52)
HGB BLD-MCNC: 11.4 G/DL (ref 11.5–16)
LYMPHOCYTES # BLD AUTO: 1.1 X 10^3 (ref 1–4)
LYMPHOCYTES NFR BLD AUTO: 22 % (ref 12–44)
MANUAL DIFFERENTIAL PERFORMED BLD QL: NO
MCH RBC QN AUTO: 31 PG (ref 25–34)
MCHC RBC AUTO-ENTMCNC: 33 G/DL (ref 32–36)
MCV RBC AUTO: 94 FL (ref 80–99)
MONOCYTES # BLD AUTO: 0.5 X 10^3 (ref 0–1)
MONOCYTES NFR BLD AUTO: 10 % (ref 0–12)
NEUTROPHILS # BLD AUTO: 3.1 X 10^3 (ref 1.8–7.8)
NEUTROPHILS NFR BLD AUTO: 64 % (ref 42–75)
PLATELET # BLD: 192 10^3/UL (ref 130–400)
PMV BLD AUTO: 10 FL (ref 7.4–10.4)
POTASSIUM SERPL-SCNC: 4.3 MMOL/L (ref 3.6–5)
SODIUM SERPL-SCNC: 134 MMOL/L (ref 135–145)
WBC # BLD AUTO: 4.9 10^3/UL (ref 4.3–11)

## 2019-02-05 LAB
ALBUMIN SERPL-MCNC: 3.9 GM/DL (ref 3.2–4.5)
ALP SERPL-CCNC: 352 U/L (ref 40–136)
ALT SERPL-CCNC: 47 U/L (ref 0–55)
BASOPHILS # BLD AUTO: 0 10^3/UL (ref 0–0.1)
BASOPHILS NFR BLD AUTO: 0 % (ref 0–10)
BILIRUB SERPL-MCNC: 0.4 MG/DL (ref 0.1–1)
BUN/CREAT SERPL: 34
CALCIUM SERPL-MCNC: 9.7 MG/DL (ref 8.5–10.1)
CHLORIDE SERPL-SCNC: 101 MMOL/L (ref 98–107)
CO2 SERPL-SCNC: 26 MMOL/L (ref 21–32)
CREAT SERPL-MCNC: 0.58 MG/DL (ref 0.6–1.3)
EOSINOPHIL # BLD AUTO: 0.2 10^3/UL (ref 0–0.3)
EOSINOPHIL NFR BLD AUTO: 3 % (ref 0–10)
ERYTHROCYTE [DISTWIDTH] IN BLOOD BY AUTOMATED COUNT: 15 % (ref 10–14.5)
GFR SERPLBLD BASED ON 1.73 SQ M-ARVRAT: > 60 ML/MIN
GLUCOSE SERPL-MCNC: 110 MG/DL (ref 70–105)
HCT VFR BLD CALC: 35 % (ref 35–52)
HGB BLD-MCNC: 11.7 G/DL (ref 11.5–16)
LYMPHOCYTES # BLD AUTO: 1.2 X 10^3 (ref 1–4)
LYMPHOCYTES NFR BLD AUTO: 23 % (ref 12–44)
MANUAL DIFFERENTIAL PERFORMED BLD QL: NO
MCH RBC QN AUTO: 31 PG (ref 25–34)
MCHC RBC AUTO-ENTMCNC: 33 G/DL (ref 32–36)
MCV RBC AUTO: 95 FL (ref 80–99)
MONOCYTES # BLD AUTO: 0.7 X 10^3 (ref 0–1)
MONOCYTES NFR BLD AUTO: 12 % (ref 0–12)
NEUTROPHILS # BLD AUTO: 3.2 X 10^3 (ref 1.8–7.8)
NEUTROPHILS NFR BLD AUTO: 61 % (ref 42–75)
PLATELET # BLD: 174 10^3/UL (ref 130–400)
PMV BLD AUTO: 10 FL (ref 7.4–10.4)
POTASSIUM SERPL-SCNC: 4.3 MMOL/L (ref 3.6–5)
PROT SERPL-MCNC: 7.1 GM/DL (ref 6.4–8.2)
SODIUM SERPL-SCNC: 138 MMOL/L (ref 135–145)
WBC # BLD AUTO: 5.3 10^3/UL (ref 4.3–11)

## 2019-02-19 ENCOUNTER — HOSPITAL ENCOUNTER (OUTPATIENT)
Dept: HOSPITAL 75 - ONC | Age: 79
LOS: 47 days | Discharge: HOME | End: 2019-04-07
Attending: INTERNAL MEDICINE
Payer: MEDICARE

## 2019-02-19 VITALS — HEIGHT: 61 IN | BODY MASS INDEX: 20.39 KG/M2 | WEIGHT: 108 LBS

## 2019-02-19 DIAGNOSIS — C25.0: ICD-10-CM

## 2019-02-19 DIAGNOSIS — C78.02: ICD-10-CM

## 2019-02-19 DIAGNOSIS — Z79.899: ICD-10-CM

## 2019-02-19 DIAGNOSIS — C79.51: ICD-10-CM

## 2019-02-19 DIAGNOSIS — C78.01: ICD-10-CM

## 2019-02-19 DIAGNOSIS — Z51.11: Primary | ICD-10-CM

## 2019-02-19 DIAGNOSIS — C78.7: ICD-10-CM

## 2019-02-19 DIAGNOSIS — Z93.1: ICD-10-CM

## 2019-02-19 LAB
BASOPHILS # BLD AUTO: 0 10^3/UL (ref 0–0.1)
BASOPHILS NFR BLD AUTO: 0 % (ref 0–10)
BUN/CREAT SERPL: 34
CALCIUM SERPL-MCNC: 9.7 MG/DL (ref 8.5–10.1)
CHLORIDE SERPL-SCNC: 102 MMOL/L (ref 98–107)
CO2 SERPL-SCNC: 25 MMOL/L (ref 21–32)
CREAT SERPL-MCNC: 0.62 MG/DL (ref 0.6–1.3)
EOSINOPHIL # BLD AUTO: 0.1 10^3/UL (ref 0–0.3)
EOSINOPHIL NFR BLD AUTO: 1 % (ref 0–10)
ERYTHROCYTE [DISTWIDTH] IN BLOOD BY AUTOMATED COUNT: 15.1 % (ref 10–14.5)
GFR SERPLBLD BASED ON 1.73 SQ M-ARVRAT: > 60 ML/MIN
GLUCOSE SERPL-MCNC: 119 MG/DL (ref 70–105)
HCT VFR BLD CALC: 33 % (ref 35–52)
HGB BLD-MCNC: 10.6 G/DL (ref 11.5–16)
LYMPHOCYTES # BLD AUTO: 0.8 X 10^3 (ref 1–4)
LYMPHOCYTES NFR BLD AUTO: 13 % (ref 12–44)
MANUAL DIFFERENTIAL PERFORMED BLD QL: NO
MCH RBC QN AUTO: 31 PG (ref 25–34)
MCHC RBC AUTO-ENTMCNC: 33 G/DL (ref 32–36)
MCV RBC AUTO: 95 FL (ref 80–99)
MONOCYTES # BLD AUTO: 0.7 X 10^3 (ref 0–1)
MONOCYTES NFR BLD AUTO: 11 % (ref 0–12)
NEUTROPHILS # BLD AUTO: 5 X 10^3 (ref 1.8–7.8)
NEUTROPHILS NFR BLD AUTO: 75 % (ref 42–75)
PLATELET # BLD: 162 10^3/UL (ref 130–400)
PMV BLD AUTO: 10.4 FL (ref 7.4–10.4)
POTASSIUM SERPL-SCNC: 4.5 MMOL/L (ref 3.6–5)
SODIUM SERPL-SCNC: 137 MMOL/L (ref 135–145)
WBC # BLD AUTO: 6.7 10^3/UL (ref 4.3–11)

## 2019-02-19 PROCEDURE — 96417 CHEMO IV INFUS EACH ADDL SEQ: CPT

## 2019-02-19 PROCEDURE — 80048 BASIC METABOLIC PNL TOTAL CA: CPT

## 2019-02-19 PROCEDURE — 80053 COMPREHEN METABOLIC PANEL: CPT

## 2019-02-19 PROCEDURE — 96413 CHEMO IV INFUSION 1 HR: CPT

## 2019-02-19 PROCEDURE — 96375 TX/PRO/DX INJ NEW DRUG ADDON: CPT

## 2019-02-19 PROCEDURE — 36591 DRAW BLOOD OFF VENOUS DEVICE: CPT

## 2019-02-19 PROCEDURE — 85025 COMPLETE CBC W/AUTO DIFF WBC: CPT
